# Patient Record
Sex: FEMALE | Race: WHITE | NOT HISPANIC OR LATINO | Employment: OTHER | URBAN - METROPOLITAN AREA
[De-identification: names, ages, dates, MRNs, and addresses within clinical notes are randomized per-mention and may not be internally consistent; named-entity substitution may affect disease eponyms.]

---

## 2018-05-15 ENCOUNTER — HOSPITAL ENCOUNTER (INPATIENT)
Facility: HOSPITAL | Age: 53
LOS: 7 days | Discharge: NURSING, REHAB OR LONG-TERM CARE | End: 2018-05-22
Attending: INTERNAL MEDICINE | Admitting: FAMILY MEDICINE

## 2018-05-15 ENCOUNTER — APPOINTMENT (OUTPATIENT)
Dept: GENERAL RADIOLOGY | Facility: HOSPITAL | Age: 53
End: 2018-05-15

## 2018-05-15 DIAGNOSIS — Z78.9 IMPAIRED MOBILITY AND ADLS: ICD-10-CM

## 2018-05-15 DIAGNOSIS — Z74.09 IMPAIRED FUNCTIONAL MOBILITY, BALANCE, GAIT, AND ENDURANCE: Primary | ICD-10-CM

## 2018-05-15 DIAGNOSIS — Z74.09 IMPAIRED MOBILITY AND ADLS: ICD-10-CM

## 2018-05-15 PROBLEM — J96.21 ACUTE ON CHRONIC RESPIRATORY FAILURE WITH HYPOXIA AND HYPERCAPNIA (HCC): Status: ACTIVE | Noted: 2018-05-15

## 2018-05-15 PROBLEM — J96.22 ACUTE ON CHRONIC RESPIRATORY FAILURE WITH HYPOXIA AND HYPERCAPNIA (HCC): Status: ACTIVE | Noted: 2018-05-15

## 2018-05-15 PROBLEM — F41.9 ANXIETY AND DEPRESSION: Status: ACTIVE | Noted: 2018-05-15

## 2018-05-15 PROBLEM — E03.9 HYPOTHYROID: Status: ACTIVE | Noted: 2018-05-15

## 2018-05-15 PROBLEM — F32.A DEPRESSION: Status: ACTIVE | Noted: 2018-05-15

## 2018-05-15 PROBLEM — R06.03 ACUTE RESPIRATORY DISTRESS: Status: ACTIVE | Noted: 2018-05-15

## 2018-05-15 PROBLEM — J18.9 PNEUMONIA DUE TO INFECTIOUS ORGANISM: Status: ACTIVE | Noted: 2018-05-15

## 2018-05-15 PROBLEM — J44.1 CHRONIC OBSTRUCTIVE PULMONARY DISEASE WITH ACUTE EXACERBATION (HCC): Status: ACTIVE | Noted: 2018-05-15

## 2018-05-15 PROBLEM — J44.9 COPD (CHRONIC OBSTRUCTIVE PULMONARY DISEASE) (HCC): Status: ACTIVE | Noted: 2018-05-15

## 2018-05-15 LAB
ALBUMIN SERPL-MCNC: 3.5 G/DL (ref 3.2–4.8)
ALBUMIN/GLOB SERPL: 1.5 G/DL (ref 1.5–2.5)
ALP SERPL-CCNC: 81 U/L (ref 25–100)
ALT SERPL W P-5'-P-CCNC: 31 U/L (ref 7–40)
AMPHET+METHAMPHET UR QL: NEGATIVE
AMPHETAMINES UR QL: NEGATIVE
AMYLASE SERPL-CCNC: 82 U/L (ref 30–118)
ANION GAP SERPL CALCULATED.3IONS-SCNC: 7 MMOL/L (ref 3–11)
ARTERIAL PATENCY WRIST A: ABNORMAL
AST SERPL-CCNC: 27 U/L (ref 0–33)
ATMOSPHERIC PRESS: ABNORMAL MMHG
BACTERIA UR QL AUTO: ABNORMAL /HPF
BARBITURATES UR QL SCN: NEGATIVE
BASE EXCESS BLDA CALC-SCNC: 3.6 MMOL/L (ref 0–2)
BASOPHILS # BLD AUTO: 0.01 10*3/MM3 (ref 0–0.2)
BASOPHILS NFR BLD AUTO: 0.1 % (ref 0–1)
BDY SITE: ABNORMAL
BENZODIAZ UR QL SCN: POSITIVE
BILIRUB SERPL-MCNC: 0.5 MG/DL (ref 0.3–1.2)
BILIRUB UR QL STRIP: NEGATIVE
BNP SERPL-MCNC: 163 PG/ML (ref 0–100)
BUN BLD-MCNC: 24 MG/DL (ref 9–23)
BUN/CREAT SERPL: 26.7 (ref 7–25)
BUPRENORPHINE SERPL-MCNC: NEGATIVE NG/ML
CALCIUM SPEC-SCNC: 8.6 MG/DL (ref 8.7–10.4)
CANNABINOIDS SERPL QL: NEGATIVE
CHLORIDE SERPL-SCNC: 103 MMOL/L (ref 99–109)
CLARITY UR: CLEAR
CO2 BLDA-SCNC: 30 MMOL/L (ref 22–33)
CO2 SERPL-SCNC: 29 MMOL/L (ref 20–31)
COCAINE UR QL: NEGATIVE
COHGB MFR BLD: 1.4 % (ref 0–2)
COLOR UR: YELLOW
CREAT BLD-MCNC: 0.9 MG/DL (ref 0.6–1.3)
D-LACTATE SERPL-SCNC: 1.6 MMOL/L (ref 0.5–2)
DEPRECATED RDW RBC AUTO: 51.7 FL (ref 37–54)
EOSINOPHIL # BLD AUTO: 0 10*3/MM3 (ref 0–0.3)
EOSINOPHIL NFR BLD AUTO: 0 % (ref 0–3)
ERYTHROCYTE [DISTWIDTH] IN BLOOD BY AUTOMATED COUNT: 15 % (ref 11.3–14.5)
GFR SERPL CREATININE-BSD FRML MDRD: 65 ML/MIN/1.73
GLOBULIN UR ELPH-MCNC: 2.3 GM/DL
GLUCOSE BLD-MCNC: 131 MG/DL (ref 70–100)
GLUCOSE BLDC GLUCOMTR-MCNC: 145 MG/DL (ref 70–130)
GLUCOSE UR STRIP-MCNC: NEGATIVE MG/DL
HCO3 BLDA-SCNC: 28.7 MMOL/L (ref 20–26)
HCT VFR BLD AUTO: 39.9 % (ref 34.5–44)
HCT VFR BLD CALC: 37.8 %
HGB BLD-MCNC: 12 G/DL (ref 11.5–15.5)
HGB BLDA-MCNC: 12.3 G/DL (ref 14–18)
HGB UR QL STRIP.AUTO: ABNORMAL
HOROWITZ INDEX BLD+IHG-RTO: 30 %
HYALINE CASTS UR QL AUTO: ABNORMAL /LPF
IMM GRANULOCYTES # BLD: 0.11 10*3/MM3 (ref 0–0.03)
IMM GRANULOCYTES NFR BLD: 0.7 % (ref 0–0.6)
INR PPP: 0.97 (ref 0.91–1.09)
KETONES UR QL STRIP: NEGATIVE
LEUKOCYTE ESTERASE UR QL STRIP.AUTO: ABNORMAL
LIPASE SERPL-CCNC: 30 U/L (ref 6–51)
LYMPHOCYTES # BLD AUTO: 0.39 10*3/MM3 (ref 0.6–4.8)
LYMPHOCYTES NFR BLD AUTO: 2.4 % (ref 24–44)
MAGNESIUM SERPL-MCNC: 1.8 MG/DL (ref 1.3–2.7)
MCH RBC QN AUTO: 28.3 PG (ref 27–31)
MCHC RBC AUTO-ENTMCNC: 30.1 G/DL (ref 32–36)
MCV RBC AUTO: 94.1 FL (ref 80–99)
METHADONE UR QL SCN: NEGATIVE
METHGB BLD QL: 0.8 % (ref 0–1.5)
MODALITY: ABNORMAL
MONOCYTES # BLD AUTO: 0.32 10*3/MM3 (ref 0–1)
MONOCYTES NFR BLD AUTO: 2 % (ref 0–12)
NEUTROPHILS # BLD AUTO: 15.54 10*3/MM3 (ref 1.5–8.3)
NEUTROPHILS NFR BLD AUTO: 95.5 % (ref 41–71)
NITRITE UR QL STRIP: NEGATIVE
OPIATES UR QL: NEGATIVE
OXYCODONE UR QL SCN: NEGATIVE
OXYHGB MFR BLDV: 93.1 % (ref 94–99)
PCO2 BLDA: 44.2 MM HG (ref 35–45)
PCP UR QL SCN: NEGATIVE
PH BLDA: 7.42 PH UNITS (ref 7.35–7.45)
PH UR STRIP.AUTO: 6.5 [PH] (ref 5–8)
PHOSPHATE SERPL-MCNC: 3.2 MG/DL (ref 2.4–5.1)
PLATELET # BLD AUTO: 312 10*3/MM3 (ref 150–450)
PMV BLD AUTO: 9.8 FL (ref 6–12)
PO2 BLDA: 65.4 MM HG (ref 83–108)
POTASSIUM BLD-SCNC: 4.7 MMOL/L (ref 3.5–5.5)
PROCALCITONIN SERPL-MCNC: 0.34 NG/ML
PROPOXYPH UR QL: NEGATIVE
PROT SERPL-MCNC: 5.8 G/DL (ref 5.7–8.2)
PROT UR QL STRIP: ABNORMAL
PROTHROMBIN TIME: 10.2 SECONDS (ref 9.6–11.5)
RBC # BLD AUTO: 4.24 10*6/MM3 (ref 3.89–5.14)
RBC # UR: ABNORMAL /HPF
REF LAB TEST METHOD: ABNORMAL
SODIUM BLD-SCNC: 139 MMOL/L (ref 132–146)
SP GR UR STRIP: 1.02 (ref 1–1.03)
SQUAMOUS #/AREA URNS HPF: ABNORMAL /HPF
TRICYCLICS UR QL SCN: NEGATIVE
TROPONIN I SERPL-MCNC: 0.04 NG/ML
TSH SERPL DL<=0.05 MIU/L-ACNC: 1.15 MIU/ML (ref 0.35–5.35)
UROBILINOGEN UR QL STRIP: ABNORMAL
WBC NRBC COR # BLD: 16.26 10*3/MM3 (ref 3.5–10.8)
WBC UR QL AUTO: ABNORMAL /HPF

## 2018-05-15 PROCEDURE — 25010000002 LEVOFLOXACIN PER 250 MG: Performed by: INTERNAL MEDICINE

## 2018-05-15 PROCEDURE — 94799 UNLISTED PULMONARY SVC/PX: CPT

## 2018-05-15 PROCEDURE — 84100 ASSAY OF PHOSPHORUS: CPT | Performed by: NURSE PRACTITIONER

## 2018-05-15 PROCEDURE — 84145 PROCALCITONIN (PCT): CPT | Performed by: NURSE PRACTITIONER

## 2018-05-15 PROCEDURE — 82962 GLUCOSE BLOOD TEST: CPT

## 2018-05-15 PROCEDURE — 84443 ASSAY THYROID STIM HORMONE: CPT | Performed by: NURSE PRACTITIONER

## 2018-05-15 PROCEDURE — 85025 COMPLETE CBC W/AUTO DIFF WBC: CPT | Performed by: NURSE PRACTITIONER

## 2018-05-15 PROCEDURE — 25010000002 CEFTRIAXONE PER 250 MG: Performed by: INTERNAL MEDICINE

## 2018-05-15 PROCEDURE — 85610 PROTHROMBIN TIME: CPT | Performed by: NURSE PRACTITIONER

## 2018-05-15 PROCEDURE — 84484 ASSAY OF TROPONIN QUANT: CPT | Performed by: NURSE PRACTITIONER

## 2018-05-15 PROCEDURE — 99291 CRITICAL CARE FIRST HOUR: CPT | Performed by: INTERNAL MEDICINE

## 2018-05-15 PROCEDURE — P9041 ALBUMIN (HUMAN),5%, 50ML: HCPCS | Performed by: NURSE PRACTITIONER

## 2018-05-15 PROCEDURE — 80053 COMPREHEN METABOLIC PANEL: CPT | Performed by: NURSE PRACTITIONER

## 2018-05-15 PROCEDURE — 82805 BLOOD GASES W/O2 SATURATION: CPT | Performed by: NURSE PRACTITIONER

## 2018-05-15 PROCEDURE — 82150 ASSAY OF AMYLASE: CPT | Performed by: NURSE PRACTITIONER

## 2018-05-15 PROCEDURE — 87899 AGENT NOS ASSAY W/OPTIC: CPT | Performed by: INTERNAL MEDICINE

## 2018-05-15 PROCEDURE — 94640 AIRWAY INHALATION TREATMENT: CPT

## 2018-05-15 PROCEDURE — 94760 N-INVAS EAR/PLS OXIMETRY 1: CPT

## 2018-05-15 PROCEDURE — 80306 DRUG TEST PRSMV INSTRMNT: CPT | Performed by: NURSE PRACTITIONER

## 2018-05-15 PROCEDURE — 87040 BLOOD CULTURE FOR BACTERIA: CPT | Performed by: INTERNAL MEDICINE

## 2018-05-15 PROCEDURE — 25010000002 HEPARIN (PORCINE) PER 1000 UNITS: Performed by: NURSE PRACTITIONER

## 2018-05-15 PROCEDURE — 25010000002 ALBUMIN HUMAN 5% PER 50 ML: Performed by: NURSE PRACTITIONER

## 2018-05-15 PROCEDURE — 83605 ASSAY OF LACTIC ACID: CPT | Performed by: NURSE PRACTITIONER

## 2018-05-15 PROCEDURE — 81001 URINALYSIS AUTO W/SCOPE: CPT | Performed by: NURSE PRACTITIONER

## 2018-05-15 PROCEDURE — 36600 WITHDRAWAL OF ARTERIAL BLOOD: CPT | Performed by: NURSE PRACTITIONER

## 2018-05-15 PROCEDURE — 25010000002 LORAZEPAM PER 2 MG: Performed by: INTERNAL MEDICINE

## 2018-05-15 PROCEDURE — 25010000002 METHYLPREDNISOLONE PER 40 MG: Performed by: INTERNAL MEDICINE

## 2018-05-15 PROCEDURE — 5A1935Z RESPIRATORY VENTILATION, LESS THAN 24 CONSECUTIVE HOURS: ICD-10-PCS | Performed by: INTERNAL MEDICINE

## 2018-05-15 PROCEDURE — 94002 VENT MGMT INPAT INIT DAY: CPT

## 2018-05-15 PROCEDURE — 83880 ASSAY OF NATRIURETIC PEPTIDE: CPT | Performed by: NURSE PRACTITIONER

## 2018-05-15 PROCEDURE — 71045 X-RAY EXAM CHEST 1 VIEW: CPT

## 2018-05-15 PROCEDURE — 83690 ASSAY OF LIPASE: CPT | Performed by: NURSE PRACTITIONER

## 2018-05-15 PROCEDURE — 83735 ASSAY OF MAGNESIUM: CPT | Performed by: NURSE PRACTITIONER

## 2018-05-15 PROCEDURE — 87449 NOS EACH ORGANISM AG IA: CPT | Performed by: INTERNAL MEDICINE

## 2018-05-15 RX ORDER — FAMOTIDINE 10 MG/ML
20 INJECTION, SOLUTION INTRAVENOUS EVERY 12 HOURS SCHEDULED
Status: DISCONTINUED | OUTPATIENT
Start: 2018-05-15 | End: 2018-05-15 | Stop reason: SDUPTHER

## 2018-05-15 RX ORDER — ACETAMINOPHEN 325 MG/1
650 TABLET ORAL EVERY 4 HOURS PRN
Status: DISCONTINUED | OUTPATIENT
Start: 2018-05-15 | End: 2018-05-17

## 2018-05-15 RX ORDER — LEVOFLOXACIN 5 MG/ML
750 INJECTION, SOLUTION INTRAVENOUS EVERY 24 HOURS
Status: DISCONTINUED | OUTPATIENT
Start: 2018-05-15 | End: 2018-05-17

## 2018-05-15 RX ORDER — PANTOPRAZOLE SODIUM 40 MG/10ML
40 INJECTION, POWDER, LYOPHILIZED, FOR SOLUTION INTRAVENOUS
Status: DISCONTINUED | OUTPATIENT
Start: 2018-05-16 | End: 2018-05-17

## 2018-05-15 RX ORDER — CEFTRIAXONE SODIUM 1 G/50ML
1 INJECTION, SOLUTION INTRAVENOUS EVERY 24 HOURS
Status: DISCONTINUED | OUTPATIENT
Start: 2018-05-15 | End: 2018-05-17

## 2018-05-15 RX ORDER — ONDANSETRON 2 MG/ML
4 INJECTION INTRAMUSCULAR; INTRAVENOUS EVERY 6 HOURS PRN
Status: DISCONTINUED | OUTPATIENT
Start: 2018-05-15 | End: 2018-05-22 | Stop reason: HOSPADM

## 2018-05-15 RX ORDER — IPRATROPIUM BROMIDE AND ALBUTEROL SULFATE 2.5; .5 MG/3ML; MG/3ML
3 SOLUTION RESPIRATORY (INHALATION)
Status: DISCONTINUED | OUTPATIENT
Start: 2018-05-15 | End: 2018-05-22 | Stop reason: HOSPADM

## 2018-05-15 RX ORDER — METHYLPREDNISOLONE SODIUM SUCCINATE 40 MG/ML
40 INJECTION, POWDER, LYOPHILIZED, FOR SOLUTION INTRAMUSCULAR; INTRAVENOUS EVERY 12 HOURS
Status: DISCONTINUED | OUTPATIENT
Start: 2018-05-15 | End: 2018-05-17 | Stop reason: SDUPTHER

## 2018-05-15 RX ORDER — SODIUM CHLORIDE 0.9 % (FLUSH) 0.9 %
1-10 SYRINGE (ML) INJECTION AS NEEDED
Status: DISCONTINUED | OUTPATIENT
Start: 2018-05-15 | End: 2018-05-22 | Stop reason: HOSPADM

## 2018-05-15 RX ORDER — LORAZEPAM 2 MG/ML
1 INJECTION INTRAMUSCULAR ONCE
Status: COMPLETED | OUTPATIENT
Start: 2018-05-15 | End: 2018-05-15

## 2018-05-15 RX ORDER — CHLORHEXIDINE GLUCONATE 0.12 MG/ML
15 RINSE ORAL EVERY 12 HOURS SCHEDULED
Status: DISCONTINUED | OUTPATIENT
Start: 2018-05-15 | End: 2018-05-17

## 2018-05-15 RX ORDER — ACETAMINOPHEN 650 MG/1
650 SUPPOSITORY RECTAL EVERY 4 HOURS PRN
Status: DISCONTINUED | OUTPATIENT
Start: 2018-05-15 | End: 2018-05-22 | Stop reason: HOSPADM

## 2018-05-15 RX ORDER — CHLORHEXIDINE GLUCONATE 0.12 MG/ML
15 RINSE ORAL EVERY 12 HOURS SCHEDULED
Status: DISCONTINUED | OUTPATIENT
Start: 2018-05-15 | End: 2018-05-15

## 2018-05-15 RX ORDER — ALBUMIN, HUMAN INJ 5% 5 %
25 SOLUTION INTRAVENOUS ONCE
Status: COMPLETED | OUTPATIENT
Start: 2018-05-15 | End: 2018-05-15

## 2018-05-15 RX ORDER — SODIUM CHLORIDE 9 MG/ML
100 INJECTION, SOLUTION INTRAVENOUS CONTINUOUS
Status: DISCONTINUED | OUTPATIENT
Start: 2018-05-15 | End: 2018-05-16

## 2018-05-15 RX ORDER — MAGNESIUM SULFATE HEPTAHYDRATE 40 MG/ML
4 INJECTION, SOLUTION INTRAVENOUS AS NEEDED
Status: DISCONTINUED | OUTPATIENT
Start: 2018-05-15 | End: 2018-05-22 | Stop reason: HOSPADM

## 2018-05-15 RX ORDER — MAGNESIUM SULFATE HEPTAHYDRATE 40 MG/ML
2 INJECTION, SOLUTION INTRAVENOUS AS NEEDED
Status: DISCONTINUED | OUTPATIENT
Start: 2018-05-15 | End: 2018-05-22 | Stop reason: HOSPADM

## 2018-05-15 RX ORDER — HEPARIN SODIUM 5000 [USP'U]/ML
5000 INJECTION, SOLUTION INTRAVENOUS; SUBCUTANEOUS EVERY 12 HOURS SCHEDULED
Status: DISCONTINUED | OUTPATIENT
Start: 2018-05-15 | End: 2018-05-16

## 2018-05-15 RX ORDER — PANTOPRAZOLE SODIUM 40 MG/10ML
40 INJECTION, POWDER, LYOPHILIZED, FOR SOLUTION INTRAVENOUS
Status: DISCONTINUED | OUTPATIENT
Start: 2018-05-16 | End: 2018-05-15

## 2018-05-15 RX ORDER — DEXMEDETOMIDINE HYDROCHLORIDE 4 UG/ML
.2-1.5 INJECTION, SOLUTION INTRAVENOUS
Status: DISCONTINUED | OUTPATIENT
Start: 2018-05-15 | End: 2018-05-16

## 2018-05-15 RX ORDER — IPRATROPIUM BROMIDE AND ALBUTEROL SULFATE 2.5; .5 MG/3ML; MG/3ML
3 SOLUTION RESPIRATORY (INHALATION) EVERY 4 HOURS PRN
Status: DISCONTINUED | OUTPATIENT
Start: 2018-05-15 | End: 2018-05-22 | Stop reason: HOSPADM

## 2018-05-15 RX ADMIN — DEXMEDETOMIDINE HYDROCHLORIDE 0.6 MCG/KG/HR: 4 INJECTION, SOLUTION INTRAVENOUS at 19:15

## 2018-05-15 RX ADMIN — CEFTRIAXONE SODIUM 1 G: 1 INJECTION, SOLUTION INTRAVENOUS at 21:29

## 2018-05-15 RX ADMIN — IPRATROPIUM BROMIDE AND ALBUTEROL SULFATE 3 ML: 2.5; .5 SOLUTION RESPIRATORY (INHALATION) at 20:03

## 2018-05-15 RX ADMIN — HEPARIN SODIUM 5000 UNITS: 5000 INJECTION, SOLUTION INTRAVENOUS; SUBCUTANEOUS at 20:00

## 2018-05-15 RX ADMIN — METHYLPREDNISOLONE SODIUM SUCCINATE 40 MG: 40 INJECTION, POWDER, FOR SOLUTION INTRAMUSCULAR; INTRAVENOUS at 21:21

## 2018-05-15 RX ADMIN — LEVOFLOXACIN 750 MG: 5 INJECTION, SOLUTION INTRAVENOUS at 22:07

## 2018-05-15 RX ADMIN — SODIUM CHLORIDE 100 ML/HR: 9 INJECTION, SOLUTION INTRAVENOUS at 23:03

## 2018-05-15 RX ADMIN — DEXMEDETOMIDINE HYDROCHLORIDE 0.8 MCG/KG/HR: 4 INJECTION, SOLUTION INTRAVENOUS at 19:33

## 2018-05-15 RX ADMIN — CHLORHEXIDINE GLUCONATE 15 ML: 1.2 RINSE ORAL at 20:00

## 2018-05-15 RX ADMIN — LORAZEPAM 1 MG: 2 INJECTION INTRAMUSCULAR; INTRAVENOUS at 18:58

## 2018-05-15 RX ADMIN — MAGNESIUM SULFATE HEPTAHYDRATE 4 G: 40 INJECTION, SOLUTION INTRAVENOUS at 21:27

## 2018-05-15 RX ADMIN — ALBUMIN HUMAN 25 G: 0.05 INJECTION, SOLUTION INTRAVENOUS at 21:45

## 2018-05-15 NOTE — H&P
Chief complaint:  Shortness of breath    Subjective     Patient is a 52 y.o. female who presented to [facility] emergency room this morning about 8 AM.  History is obtained from the chart as the patient is currently intubated.  The patient has a history of COPD.  She apparently was okay the night before but early in the morning developed acute shortness of breath.  She was tripoding in her home.  On arrival she was documented as confused and able to follow commands.  She was intubated emergently department.  Bibasal or left greater than right infiltrates were noted and felt to be consistent with acute pneumonia.  She was given Zosyn and Levaquin in the emergency department there.  They had no ICU beds and no ICU beds were available in than several hospitals for the patient remained there for the majority of the day and was transported here and arrived around 1830.  On arrival she was intubated but was not ventilated and simply had an oxygen source of 6 L attached to her endotracheal tube.  She was awake, alert, and anxious.      History  Past Medical History:   Diagnosis Date   • Anxiety    • Depression 5/15/2018   • H/O  section    • Hypothyroid 5/15/2018     History reviewed. No pertinent surgical history.  History reviewed. No pertinent family history.  Social History   Substance Use Topics   • Smoking status: Not on file   • Smokeless tobacco: Not on file   • Alcohol use Not on file     No prescriptions prior to admission.     Allergies:  Review of patient's allergies indicates not on file.    Review of Systems   Review of systems could not be obtained due to   patient intubated.      Objective     Vital Signs  Temp:  [97.8 °F (36.6 °C)] 97.8 °F (36.6 °C)  Heart Rate:  [118] 118  Resp:  [23] 23  BP: (126)/(91) 126/91    Physical Exam:    Objective:  General Appearance:  Uncomfortable.    Vital signs: (most recent): Blood pressure 126/91, pulse 118, temperature 97.8 °F (36.6 °C),  temperature source Axillary, resp. rate 23, weight 69 kg (152 lb 1.9 oz), SpO2 100 %.    HEENT: Normal HEENT exam.  (Oral ET tube)    Lungs:  Increased effort.  She is in respiratory distress.  There are decreased breath sounds and wheezes.  No rales or rhonchi.    Heart: Tachycardia.  Regular rhythm.  S1 normal and S2 normal.  No murmur, gallop or friction rub.   Chest: Symmetric chest wall expansion.   Abdomen: Abdomen is distended.  (Mild gaseous distention).  Bowel sounds are normal.   There is no abdominal tenderness.   There is no mass. There is no splenomegaly. There is no hepatomegaly.   Extremities: There is no deformity or dependent edema.    Neurological: Patient is alert.    Pupils:  Pupils are equal, round, and reactive to light.    Skin:  Warm and dry.              Results Review:    I reviewed the patient's new clinical results.  I reviewed the patient's other test results and agree with the interpretation  I personally viewed and interpreted the patient's EKG/Telemetry data    Assessment/Plan     Assessment:    Hospital Problem List     Acute on chronic respiratory failure with hypoxia and hypercapnia    Chronic obstructive pulmonary disease with acute exacerbation    Pneumonia due to infectious organism    Anxiety and depression    Hypothyroid          52-year-old female with a history of COPD of unknown physiologic severity but with apparent chronic CO2 retention based upon her blood gases.  There is evidence of acute infiltrates consistent with pneumonia by chest x-ray.  She was intubated at the outlying facility for acute on chronic respiratory failure.  She was given Zosyn and Levaquin in [facility] prior to transport here as well as steroids and bronchodilators.    Plan:    1. Patient placed on mechanical ventilation though was transported without ventilation  2. IV steroids  3. Rocephin and Levaquin  4. Panculture  5. Bronchodilators  6. Daily weaning assessment    I discussed the  patients findings and my recommendations with patient and nursing staff.     Critical Care time spent in direct patient care: 50 minutes (excluding procedure time, if applicable) including high complexity decision making to assess, manipulate, and support vital organ system failure in this individual who has impairment of one or more vital organ systems such that there is a high probability of imminent or life threatening deterioration in the patient’s condition.      Nickolas Dunham MD  Pulmonary and Critical Care Medicine  05/15/18  7:20 PM

## 2018-05-15 NOTE — NURSING NOTE
ACC REVIEW REPORT: [facility]        PATIENT NAME: Lalita Valentino    PATIENT ID: [id number]    BED: N239    BED TYPE: ICU    BED GIVEN TO: PRIYA BARBOZA RN    TIME BED GIVEN: 1505    YOB: 1965    AGE: 52 YRS    GENDER: FEMALE    PREVIOUS ADMIT TO [facility]:     PREVIOUS ADMISSION DATE:     PATIENT CLASS:     TODAY'S DATE: 5/15/2018    TRANSFER DATE: 05/15/18    ETA:     TRANSFERRING FACILITY: [facility] ER    TRANSFERRING FACILITY PHONE # : [phone]    TRANSFERRING MD: DR GONZALEZ    DATE/TIME REQUEST RECEIVED: 05/15/18 @ 6758    [facility] RN: KISHA NELSON    REPORT FROM: PRIYA BARBOZA RN     TIME REPORT TAKEN: 1505    DIAGNOSIS: RESP FAILURE, PNEUMONIA    REASON FOR TRANSFER TO [facility]: HIGHER LEVEL CARE    TRANSPORTATION: GROUND    CLINICAL REASON FOR TRANSFER TO [facility]: EMS BROUGHT PATIENT IN TO THE ER IN RESP DISTRESS. SHE COULDN'T TALK AND WAS INTUBATED SHORTLY AFTERWARDS.  THEY DON'T HAVE MUCH HISTORY ON HER.  SHE WAS A PATIENT HERE AT [facility] OVER 2 YRS AGO.        CLINICAL INFORMATION    HEIGHT:     WEIGHT:     ALLERGIES: NKDA    TREJO: 16 FR TO RIGHT LEG, HAS A TOTAL  ML UOP       URINE HAD 1+ PROTEIN AND 1+ MUCOUS.    INFECTIOUS DISEASE:     ISOLATION:     1ST VITAL SIGNS:   TIME:   TEMP:   PULSE:   B/P:   RESP:     LAST VITAL SIGNS:  TIME: 1512  TEMP: AFEBRILE  PULSE: 62  B/P: 105/77  RESP: 14  95%    LAB INFORMATION: WBC-12.47, POTASSIUM-5.2, C02-32.6 (VENOUS) CREAT-1.3, GFR-45, GLUCOSE ON ARRIVAL , LAST GLUCOSE .  DTF=658    CULTURE INFORMATION:     MEDS/IV FLUIDS: # 29 G LFA,     # 20 G RFA  ZOSYN 3.375,  1.5 L OF NS    AND CURRENTLY HAS D5NS GOING @ 125/NL/HR.  SHE'S HAD A TOTAL OF ABOUT 2L OF FLUIDS.    BEFORE SHE WAS INTUBATED, SHE HAD SOLUMEDROL 125 Mg, ALBUTEROL TX X 2, ETOMIDATE 10, 100 SUCCS.  VERSED 4 MG BOLUS, STARTED HER @ 2, TITRATED HER UP TO 6, BUT IT WAS DROPPING HER B/P-SO THEY TOOK HER OFF VERSED.  SHE IS AWAKE, ALERT AND CALM, HAS NO SEDATION.  SHE WAS ALSO  GIVEN CACATIMINE 50 MG Iv.        CARDIAC SYSTEM:    CHEST PAIN:     RATE:     SCALE:     RHYTHM: ST ON ARRIVAL     Is patient taking or has patient been given any drugs that could increase bleeding? No, PER REPORT  (Plavix, Brilinta, Effient, Eliquis, Xarelto, Warfarin, Integrilin, Angiomax)    DRUG:      DOSE/FREQUENCY:     CARDIAC ENZYMES:  WERE NEGATIVE    DATE:   TIME:   CK:   CKMB:   MARK:   TROP:     DATE:   TIME:   CK:   CKMB:   MARK:   TROP:           CARDIAC NOTES:  ST ON ARRIVAL      RESPIRATORY SYSTEM:    LUNG SOUNDS:    CLEAR:   CRACKLES:   WHEEZES:   RHONCHI:   DIMINISHED: YES    ABG DATE: 05/15/18        ABG TIME:     ABG RESULTS:    PH: 7.226  PO2: 136  PCO2:81.9   HCO3: 34  O2 SAT:       ETT:     ETT SIZE: 7.5    ORAL: YES    NASAL:     SECURED AT MEASUREMENT (CM): 22 CM @ THE LIP    ON VENTILATOR:YES    TV: 480  FI02: 35%  RATE: 22  PEEP: 15  02SAT=95%    OXYGEN:     O2 SAT:     ADMINISTRATION ROUTE:     IMAGING FINDINGS:     PNEUMO LOCATION:     PNEUMO SIZE:     PNEUMO CHEST TUBE SEAL TYPE:     RADIOLOGY RESULTS: BILATERAL PNEUMONIA    RESPIRATORY STATUS:       CNS/MUSCULOSKELETAL    ALERT AND ORIENTED:  SHE IS NOT SEDATED AND ALERT.  SHE WILL SQUEEZE YOU HAND    PERSON:   PLACE:   TIME:     I      CAT SCAN RESULTS: HEAD WAS NEGATIVE FOR AN ACUTE PROCESS    MRI RESULTS:     CNS/MUSCULOSKELETAL NOTES:       GI//GY      ABDOMINAL PAIN:     VOMITING:     DIARRHEA:     NAUSEA:     BOWEL SOUNDS:     OCCULT STOOL:     VAGINAL BLEEDING:     TESTICULAR PAIN:     HEMATURIA:     NG TUBE:    SIZE:   DATE INSERTED:       ULTRASOUND:     ULTRASOUND RESULTS:       ACUTE ABDOMEN:     ACUTE ABDOMEN RESULTS:       CT SCAN:     CT SCAN RESULTS:       GI//GY NOTES:     PAST MEDICAL HISTORY: UNAVAILABLE, PATIENT WAS INTUBATED SHORTLY AFTER ARRIVAL. COPD, THYROID WAS REMOVED OR POSSIBLY ALL REMOVED, ANXIETY, SHE'S HAD A BLISTER REMOVED FROM HER RIGHT LUNG,  X 3.      OTHER SYMPTOM NOTES:     ADDITIONAL  NOTES: NASOGASTRIC 14 FR LEFT NARE, 60 CM          Parul Munoz, RN  5/15/2018  2:54 PM

## 2018-05-16 ENCOUNTER — APPOINTMENT (OUTPATIENT)
Dept: CARDIOLOGY | Facility: HOSPITAL | Age: 53
End: 2018-05-16
Attending: INTERNAL MEDICINE

## 2018-05-16 ENCOUNTER — APPOINTMENT (OUTPATIENT)
Dept: CT IMAGING | Facility: HOSPITAL | Age: 53
End: 2018-05-16
Attending: INTERNAL MEDICINE

## 2018-05-16 ENCOUNTER — APPOINTMENT (OUTPATIENT)
Dept: GENERAL RADIOLOGY | Facility: HOSPITAL | Age: 53
End: 2018-05-16

## 2018-05-16 ENCOUNTER — APPOINTMENT (OUTPATIENT)
Dept: CT IMAGING | Facility: HOSPITAL | Age: 53
End: 2018-05-16

## 2018-05-16 LAB
ALBUMIN SERPL-MCNC: 3.9 G/DL (ref 3.2–4.8)
ALBUMIN/GLOB SERPL: 1.8 G/DL (ref 1.5–2.5)
ALP SERPL-CCNC: 60 U/L (ref 25–100)
ALT SERPL W P-5'-P-CCNC: 21 U/L (ref 7–40)
ANION GAP SERPL CALCULATED.3IONS-SCNC: 5 MMOL/L (ref 3–11)
ARTERIAL PATENCY WRIST A: ABNORMAL
ARTERIAL PATENCY WRIST A: POSITIVE
AST SERPL-CCNC: 18 U/L (ref 0–33)
ATMOSPHERIC PRESS: ABNORMAL MMHG
ATMOSPHERIC PRESS: ABNORMAL MMHG
BASE EXCESS BLDA CALC-SCNC: 4.3 MMOL/L (ref 0–2)
BASE EXCESS BLDA CALC-SCNC: 4.3 MMOL/L (ref 0–2)
BASOPHILS # BLD AUTO: 0.01 10*3/MM3 (ref 0–0.2)
BASOPHILS NFR BLD AUTO: 0.1 % (ref 0–1)
BDY SITE: ABNORMAL
BDY SITE: ABNORMAL
BILIRUB SERPL-MCNC: 0.5 MG/DL (ref 0.3–1.2)
BUN BLD-MCNC: 23 MG/DL (ref 9–23)
BUN/CREAT SERPL: 28.8 (ref 7–25)
CALCIUM SPEC-SCNC: 8.6 MG/DL (ref 8.7–10.4)
CHLORIDE SERPL-SCNC: 103 MMOL/L (ref 99–109)
CO2 BLDA-SCNC: 30.6 MMOL/L (ref 22–33)
CO2 BLDA-SCNC: 31 MMOL/L (ref 22–33)
CO2 SERPL-SCNC: 29 MMOL/L (ref 20–31)
COHGB MFR BLD: 1.4 % (ref 0–2)
COHGB MFR BLD: 1.6 % (ref 0–2)
CREAT BLD-MCNC: 0.8 MG/DL (ref 0.6–1.3)
D-LACTATE SERPL-SCNC: 1.8 MMOL/L (ref 0.5–2)
DEPRECATED RDW RBC AUTO: 52.4 FL (ref 37–54)
EOSINOPHIL # BLD AUTO: 0 10*3/MM3 (ref 0–0.3)
EOSINOPHIL NFR BLD AUTO: 0 % (ref 0–3)
ERYTHROCYTE [DISTWIDTH] IN BLOOD BY AUTOMATED COUNT: 15.2 % (ref 11.3–14.5)
GFR SERPL CREATININE-BSD FRML MDRD: 75 ML/MIN/1.73
GLOBULIN UR ELPH-MCNC: 2.2 GM/DL
GLUCOSE BLD-MCNC: 122 MG/DL (ref 70–100)
GLUCOSE BLDC GLUCOMTR-MCNC: 102 MG/DL (ref 70–130)
GLUCOSE BLDC GLUCOMTR-MCNC: 110 MG/DL (ref 70–130)
GLUCOSE BLDC GLUCOMTR-MCNC: 125 MG/DL (ref 70–130)
GLUCOSE BLDC GLUCOMTR-MCNC: 137 MG/DL (ref 70–130)
HCO3 BLDA-SCNC: 29.2 MMOL/L (ref 20–26)
HCO3 BLDA-SCNC: 29.6 MMOL/L (ref 20–26)
HCT VFR BLD AUTO: 33.1 % (ref 34.5–44)
HCT VFR BLD CALC: 30.1 %
HCT VFR BLD CALC: 31.5 %
HGB BLD-MCNC: 10.3 G/DL (ref 11.5–15.5)
HGB BLDA-MCNC: 10.3 G/DL (ref 14–18)
HGB BLDA-MCNC: 9.8 G/DL (ref 14–18)
HOROWITZ INDEX BLD+IHG-RTO: 30 %
HOROWITZ INDEX BLD+IHG-RTO: 35 %
IMM GRANULOCYTES # BLD: 0.09 10*3/MM3 (ref 0–0.03)
IMM GRANULOCYTES NFR BLD: 0.7 % (ref 0–0.6)
LYMPHOCYTES # BLD AUTO: 0.49 10*3/MM3 (ref 0.6–4.8)
LYMPHOCYTES NFR BLD AUTO: 4 % (ref 24–44)
MAGNESIUM SERPL-MCNC: 3 MG/DL (ref 1.3–2.7)
MCH RBC QN AUTO: 29.3 PG (ref 27–31)
MCHC RBC AUTO-ENTMCNC: 31.1 G/DL (ref 32–36)
MCV RBC AUTO: 94 FL (ref 80–99)
METHGB BLD QL: 0.8 % (ref 0–1.5)
METHGB BLD QL: 1.1 % (ref 0–1.5)
MODALITY: ABNORMAL
MODALITY: ABNORMAL
MONOCYTES # BLD AUTO: 0.36 10*3/MM3 (ref 0–1)
MONOCYTES NFR BLD AUTO: 3 % (ref 0–12)
NEUTROPHILS # BLD AUTO: 11.27 10*3/MM3 (ref 1.5–8.3)
NEUTROPHILS NFR BLD AUTO: 92.9 % (ref 41–71)
OXYHGB MFR BLDV: 94.6 % (ref 94–99)
OXYHGB MFR BLDV: 95.9 % (ref 94–99)
PCO2 BLDA: 44.1 MM HG (ref 35–45)
PCO2 BLDA: 46.5 MM HG (ref 35–45)
PH BLDA: 7.41 PH UNITS (ref 7.35–7.45)
PH BLDA: 7.43 PH UNITS (ref 7.35–7.45)
PHOSPHATE SERPL-MCNC: 3.4 MG/DL (ref 2.4–5.1)
PLAT MORPH BLD: NORMAL
PLATELET # BLD AUTO: 177 10*3/MM3 (ref 150–450)
PMV BLD AUTO: 11.1 FL (ref 6–12)
PO2 BLDA: 76.1 MM HG (ref 83–108)
PO2 BLDA: 85.4 MM HG (ref 83–108)
POTASSIUM BLD-SCNC: 4.3 MMOL/L (ref 3.5–5.5)
PROT SERPL-MCNC: 6.1 G/DL (ref 5.7–8.2)
RBC # BLD AUTO: 3.52 10*6/MM3 (ref 3.89–5.14)
RBC MORPH BLD: NORMAL
SAO2 % BLDCOA: 94.6 %
SODIUM BLD-SCNC: 137 MMOL/L (ref 132–146)
TROPONIN I SERPL-MCNC: 0.02 NG/ML
WBC MORPH BLD: NORMAL
WBC NRBC COR # BLD: 12.13 10*3/MM3 (ref 3.5–10.8)

## 2018-05-16 PROCEDURE — 93306 TTE W/DOPPLER COMPLETE: CPT | Performed by: INTERNAL MEDICINE

## 2018-05-16 PROCEDURE — 36600 WITHDRAWAL OF ARTERIAL BLOOD: CPT | Performed by: NURSE PRACTITIONER

## 2018-05-16 PROCEDURE — 25010000002 LEVOFLOXACIN PER 250 MG: Performed by: INTERNAL MEDICINE

## 2018-05-16 PROCEDURE — 25010000002 ENOXAPARIN PER 10 MG: Performed by: INTERNAL MEDICINE

## 2018-05-16 PROCEDURE — 82805 BLOOD GASES W/O2 SATURATION: CPT | Performed by: NURSE PRACTITIONER

## 2018-05-16 PROCEDURE — 36600 WITHDRAWAL OF ARTERIAL BLOOD: CPT | Performed by: INTERNAL MEDICINE

## 2018-05-16 PROCEDURE — 25010000002 CEFTRIAXONE PER 250 MG: Performed by: INTERNAL MEDICINE

## 2018-05-16 PROCEDURE — 87040 BLOOD CULTURE FOR BACTERIA: CPT | Performed by: INTERNAL MEDICINE

## 2018-05-16 PROCEDURE — 71045 X-RAY EXAM CHEST 1 VIEW: CPT

## 2018-05-16 PROCEDURE — 93005 ELECTROCARDIOGRAM TRACING: CPT | Performed by: INTERNAL MEDICINE

## 2018-05-16 PROCEDURE — 94799 UNLISTED PULMONARY SVC/PX: CPT

## 2018-05-16 PROCEDURE — 83605 ASSAY OF LACTIC ACID: CPT | Performed by: NURSE PRACTITIONER

## 2018-05-16 PROCEDURE — 87070 CULTURE OTHR SPECIMN AEROBIC: CPT | Performed by: INTERNAL MEDICINE

## 2018-05-16 PROCEDURE — 85025 COMPLETE CBC W/AUTO DIFF WBC: CPT | Performed by: NURSE PRACTITIONER

## 2018-05-16 PROCEDURE — 94640 AIRWAY INHALATION TREATMENT: CPT

## 2018-05-16 PROCEDURE — P9041 ALBUMIN (HUMAN),5%, 50ML: HCPCS

## 2018-05-16 PROCEDURE — 84484 ASSAY OF TROPONIN QUANT: CPT | Performed by: INTERNAL MEDICINE

## 2018-05-16 PROCEDURE — 93010 ELECTROCARDIOGRAM REPORT: CPT | Performed by: INTERNAL MEDICINE

## 2018-05-16 PROCEDURE — 25010000002 HEPARIN (PORCINE) PER 1000 UNITS: Performed by: NURSE PRACTITIONER

## 2018-05-16 PROCEDURE — 25010000002 METHYLPREDNISOLONE PER 40 MG: Performed by: INTERNAL MEDICINE

## 2018-05-16 PROCEDURE — 74178 CT ABD&PLV WO CNTR FLWD CNTR: CPT

## 2018-05-16 PROCEDURE — 87205 SMEAR GRAM STAIN: CPT | Performed by: INTERNAL MEDICINE

## 2018-05-16 PROCEDURE — 25010000002 IOPAMIDOL 61 % SOLUTION: Performed by: INTERNAL MEDICINE

## 2018-05-16 PROCEDURE — 85007 BL SMEAR W/DIFF WBC COUNT: CPT | Performed by: NURSE PRACTITIONER

## 2018-05-16 PROCEDURE — 99291 CRITICAL CARE FIRST HOUR: CPT | Performed by: INTERNAL MEDICINE

## 2018-05-16 PROCEDURE — 25010000002 FENTANYL 10 MCG/1 ML NS: Performed by: INTERNAL MEDICINE

## 2018-05-16 PROCEDURE — 82962 GLUCOSE BLOOD TEST: CPT

## 2018-05-16 PROCEDURE — 84100 ASSAY OF PHOSPHORUS: CPT | Performed by: NURSE PRACTITIONER

## 2018-05-16 PROCEDURE — 25010000002 ALBUMIN HUMAN 5% PER 50 ML

## 2018-05-16 PROCEDURE — 80053 COMPREHEN METABOLIC PANEL: CPT | Performed by: NURSE PRACTITIONER

## 2018-05-16 PROCEDURE — 93306 TTE W/DOPPLER COMPLETE: CPT

## 2018-05-16 PROCEDURE — 94003 VENT MGMT INPAT SUBQ DAY: CPT

## 2018-05-16 PROCEDURE — 0 DIATRIZOATE MEGLUMINE & SODIUM PER 1 ML

## 2018-05-16 PROCEDURE — 83735 ASSAY OF MAGNESIUM: CPT | Performed by: NURSE PRACTITIONER

## 2018-05-16 PROCEDURE — 94760 N-INVAS EAR/PLS OXIMETRY 1: CPT

## 2018-05-16 PROCEDURE — 82805 BLOOD GASES W/O2 SATURATION: CPT | Performed by: INTERNAL MEDICINE

## 2018-05-16 RX ORDER — HYDROCODONE BITARTRATE AND ACETAMINOPHEN 10; 325 MG/1; MG/1
1 TABLET ORAL EVERY 6 HOURS PRN
Status: ON HOLD | COMMUNITY
End: 2018-05-16

## 2018-05-16 RX ORDER — MIRTAZAPINE 15 MG/1
15 TABLET, FILM COATED ORAL NIGHTLY
Status: DISCONTINUED | OUTPATIENT
Start: 2018-05-16 | End: 2018-05-22 | Stop reason: HOSPADM

## 2018-05-16 RX ORDER — LEVOTHYROXINE SODIUM 112 UG/1
112 TABLET ORAL DAILY
COMMUNITY

## 2018-05-16 RX ORDER — GABAPENTIN 300 MG/1
300 CAPSULE ORAL 2 TIMES DAILY
COMMUNITY

## 2018-05-16 RX ORDER — LEVOTHYROXINE SODIUM 112 UG/1
112 TABLET ORAL
Status: DISCONTINUED | OUTPATIENT
Start: 2018-05-16 | End: 2018-05-22 | Stop reason: HOSPADM

## 2018-05-16 RX ORDER — MIRTAZAPINE 15 MG/1
15 TABLET, FILM COATED ORAL NIGHTLY
COMMUNITY

## 2018-05-16 RX ORDER — ACETAMINOPHEN 325 MG/1
650 TABLET ORAL EVERY 6 HOURS PRN
Status: DISCONTINUED | OUTPATIENT
Start: 2018-05-16 | End: 2018-05-22 | Stop reason: HOSPADM

## 2018-05-16 RX ORDER — BUPROPION HYDROCHLORIDE 150 MG/1
150 TABLET, EXTENDED RELEASE ORAL 2 TIMES DAILY
COMMUNITY

## 2018-05-16 RX ORDER — DEXMEDETOMIDINE HYDROCHLORIDE 4 UG/ML
.2-1.5 INJECTION, SOLUTION INTRAVENOUS
Status: DISCONTINUED | OUTPATIENT
Start: 2018-05-16 | End: 2018-05-17

## 2018-05-16 RX ORDER — BUPROPION HYDROCHLORIDE 150 MG/1
150 TABLET, EXTENDED RELEASE ORAL 2 TIMES DAILY
Status: DISCONTINUED | OUTPATIENT
Start: 2018-05-16 | End: 2018-05-22 | Stop reason: HOSPADM

## 2018-05-16 RX ORDER — GABAPENTIN 300 MG/1
300 CAPSULE ORAL 2 TIMES DAILY
Status: DISCONTINUED | OUTPATIENT
Start: 2018-05-16 | End: 2018-05-22 | Stop reason: HOSPADM

## 2018-05-16 RX ORDER — ALBUMIN, HUMAN INJ 5% 5 %
25 SOLUTION INTRAVENOUS ONCE
Status: DISCONTINUED | OUTPATIENT
Start: 2018-05-16 | End: 2018-05-22 | Stop reason: HOSPADM

## 2018-05-16 RX ORDER — ALBUMIN, HUMAN INJ 5% 5 %
SOLUTION INTRAVENOUS
Status: COMPLETED
Start: 2018-05-16 | End: 2018-05-16

## 2018-05-16 RX ADMIN — CHLORHEXIDINE GLUCONATE 15 ML: 1.2 RINSE ORAL at 20:16

## 2018-05-16 RX ADMIN — BUPROPION HYDROCHLORIDE 150 MG: 150 TABLET, EXTENDED RELEASE ORAL at 20:15

## 2018-05-16 RX ADMIN — Medication: at 15:04

## 2018-05-16 RX ADMIN — Medication 25 MCG/HR: at 01:58

## 2018-05-16 RX ADMIN — DIATRIZOATE MEGLUMINE AND DIATRIZOATE SODIUM 120 ML: 660; 100 LIQUID ORAL; RECTAL at 10:25

## 2018-05-16 RX ADMIN — GABAPENTIN 300 MG: 300 CAPSULE ORAL at 14:53

## 2018-05-16 RX ADMIN — DIATRIZOATE MEGLUMINE AND DIATRIZOATE SODIUM 120 ML: 660; 100 LIQUID ORAL; RECTAL at 15:03

## 2018-05-16 RX ADMIN — IOPAMIDOL 95 ML: 612 INJECTION, SOLUTION INTRAVENOUS at 16:12

## 2018-05-16 RX ADMIN — DEXMEDETOMIDINE HYDROCHLORIDE 0.6 MCG/KG/HR: 4 INJECTION, SOLUTION INTRAVENOUS at 20:14

## 2018-05-16 RX ADMIN — HEPARIN SODIUM 5000 UNITS: 5000 INJECTION, SOLUTION INTRAVENOUS; SUBCUTANEOUS at 08:00

## 2018-05-16 RX ADMIN — ALBUMIN (HUMAN): 0.05 SOLUTION INTRAVENOUS at 02:38

## 2018-05-16 RX ADMIN — IPRATROPIUM BROMIDE AND ALBUTEROL SULFATE 3 ML: 2.5; .5 SOLUTION RESPIRATORY (INHALATION) at 07:38

## 2018-05-16 RX ADMIN — IPRATROPIUM BROMIDE AND ALBUTEROL SULFATE 3 ML: 2.5; .5 SOLUTION RESPIRATORY (INHALATION) at 00:52

## 2018-05-16 RX ADMIN — MIRTAZAPINE 15 MG: 15 TABLET, FILM COATED ORAL at 20:15

## 2018-05-16 RX ADMIN — LEVOTHYROXINE SODIUM 112 MCG: 112 TABLET ORAL at 14:53

## 2018-05-16 RX ADMIN — IPRATROPIUM BROMIDE AND ALBUTEROL SULFATE 3 ML: 2.5; .5 SOLUTION RESPIRATORY (INHALATION) at 18:56

## 2018-05-16 RX ADMIN — PANTOPRAZOLE SODIUM 40 MG: 40 INJECTION, POWDER, FOR SOLUTION INTRAVENOUS at 05:52

## 2018-05-16 RX ADMIN — METHYLPREDNISOLONE SODIUM SUCCINATE 40 MG: 40 INJECTION, POWDER, FOR SOLUTION INTRAMUSCULAR; INTRAVENOUS at 08:00

## 2018-05-16 RX ADMIN — GABAPENTIN 300 MG: 300 CAPSULE ORAL at 20:15

## 2018-05-16 RX ADMIN — LEVOFLOXACIN 750 MG: 5 INJECTION, SOLUTION INTRAVENOUS at 22:44

## 2018-05-16 RX ADMIN — METHYLPREDNISOLONE SODIUM SUCCINATE 40 MG: 40 INJECTION, POWDER, FOR SOLUTION INTRAMUSCULAR; INTRAVENOUS at 20:15

## 2018-05-16 RX ADMIN — CEFTRIAXONE SODIUM 1 G: 1 INJECTION, SOLUTION INTRAVENOUS at 20:15

## 2018-05-16 RX ADMIN — IPRATROPIUM BROMIDE AND ALBUTEROL SULFATE 3 ML: 2.5; .5 SOLUTION RESPIRATORY (INHALATION) at 12:47

## 2018-05-16 RX ADMIN — DEXMEDETOMIDINE HYDROCHLORIDE 0.4 MCG/KG/HR: 4 INJECTION, SOLUTION INTRAVENOUS at 12:41

## 2018-05-16 RX ADMIN — BUPROPION HYDROCHLORIDE 150 MG: 150 TABLET, EXTENDED RELEASE ORAL at 14:53

## 2018-05-16 RX ADMIN — ENOXAPARIN SODIUM 40 MG: 40 INJECTION SUBCUTANEOUS at 14:54

## 2018-05-16 RX ADMIN — CHLORHEXIDINE GLUCONATE 15 ML: 1.2 RINSE ORAL at 08:01

## 2018-05-16 NOTE — PROGRESS NOTES
Clinical Nutrition Note      Patient Name: Lalita Valentino  MRN: [id number]  Admission date: 5/15/2018      Reason for Assessment:  Multidisciplinary Rounds    Additional information obtained during MDR:  RN reports pt ready for extubation; although intubated she was on 6.0 L  O2 not mechanically ventilated on arrival. Pt c/o of abdominal pain; distended and tense, CT ordered. Orders given for extubation.    Current diet: NPO Diet    Pertinent medical data reviewed    Intervention:  Plan of care and goals reviewed    Monitor:  RD to follow per protocol      Janiya Little RD  05/16/18 11:49 AM  Time: 20min

## 2018-05-16 NOTE — PLAN OF CARE
Problem: Patient Care Overview  Goal: Plan of Care Review  Outcome: Ongoing (interventions implemented as appropriate)   05/16/18 0516   Coping/Psychosocial   Plan of Care Reviewed With patient;family   Plan of Care Review   Progress improving       Problem: Skin Injury Risk (Adult)  Goal: Identify Related Risk Factors and Signs and Symptoms  Outcome: Ongoing (interventions implemented as appropriate)   05/16/18 0516   Skin Injury Risk (Adult)   Related Risk Factors (Skin Injury Risk) critical care admission     Goal: Skin Health and Integrity  Outcome: Ongoing (interventions implemented as appropriate)   05/16/18 0516   Skin Injury Risk (Adult)   Skin Health and Integrity making progress toward outcome       Problem: Ventilation, Mechanical Invasive (Adult)  Goal: Signs and Symptoms of Listed Potential Problems Will be Absent, Minimized or Managed (Ventilation, Mechanical Invasive)  Outcome: Ongoing (interventions implemented as appropriate)   05/16/18 0516   Goal/Outcome Evaluation   Problems Assessed (Mechanical Ventilation, Invasive) all   Problems Present (Mech Vent, Invasive) none

## 2018-05-16 NOTE — PLAN OF CARE
Problem: Ventilation, Mechanical Invasive (Adult)  Intervention: Prevent Airway Displacement/Mechanical Dysfunction   05/16/18 0547   Prevent Airway Displacement/Mechanical Dysfunction   Airway Safety Measures manual resuscitator/mask/valve in room;suction at bedside     Intervention: Optimize Oxygenation/Ventilation   05/16/18 0547   Respiratory Interventions   Airway/Ventilation Management airway patency maintained;calming measures promoted;humidification applied;pulmonary hygiene promoted

## 2018-05-16 NOTE — PROGRESS NOTES
INTENSIVIST / PULMONARY FOLLOW UP NOTE     Hospital:  LOS: 1 day   Ms. Lalita Valentino, 52 y.o. female is followed for:   Active Problems:    Acute on chronic respiratory failure with hypoxia and hypercapnia    Chronic obstructive pulmonary disease with acute exacerbation    Anxiety and depression    Hypothyroid    Pneumonia due to infectious organism          SUBJECTIVE   Doing well on PS this morning, plan to extubate    The patient's relevant past medical, surgical, family, and social history were reviewed    Allergies and medications were reviewed    ROS:  Per subjective, all other systems were reviewed and were negative        OBJECTIVE     Vital Sign Min/Max for last 24 hours:  Temp  Min: 96.4 °F (35.8 °C)  Max: 98.6 °F (37 °C)   BP  Min: 78/49  Max: 135/73   Pulse  Min: 73  Max: 118   Resp  Min: 13  Max: 23   SpO2  Min: 93 %  Max: 100 %   No Data Recorded     Physical Exam:  General Appearance:  Intubated, in no acute distress  Eyes:  No scleral icterus or pallor, pupils normal  Ears, Nose, Mouth, Throat:  Atraumatic, oral endotracheal tube  Neck:  Trachea midline, thyroid normal  Respiratory:  Clear to auscultation bilaterally, normal effort  Cardiovascular:  Regular rate and rhythm, no murmurs, no peripheral edema  Gastrointestinal:  Abdomen firm, tender, distended, no hepatosplenomegaly  Skin:  Normal temperature, no rash  Psychiatric:  Intubated, sedated, no agitation  Neuro:  No new focal neurologic deficits observed      Telemetry:              Hemodynamics:   CVP:     PAP:     PAOP:     CO:     CI:     SVI:     SVR:       SpO2: 97 % SpO2  Min: 93 %  Max: 100 %   Device:      Flow Rate:   No Data Recorded     Mechanical Ventilator Settings:       Vt (Set, L): 0.4 L    Resp Rate (Set): 12 Resp Rate (Observed) Vent: 17   FiO2 (%): 35 %    PEEP/CPAP (cm H2O): 5 cm H20      Minute Ventilation (L/min) (Obs): 6.85 L/min    I:E Ratio (Obs): 1:3.60     Intake/Ouptut 24 hrs (7:00AM - 6:59 AM)  Intake & Output  (last 3 days)       05/13 0701 - 05/14 0700 05/14 0701 - 05/15 0700 05/15 0701 - 05/16 0700 05/16 0701 - 05/17 0700    I.V. (mL/kg)   741.7 (10.7) 403.6 (5.8)    IV Piggyback   1250.5     Total Intake(mL/kg)   1992.2 (28.7) 403.6 (5.8)    Urine (mL/kg/hr)   1000 320 (0.9)    Emesis/NG output   350     Total Output     1350 320    Net     +642.2 +83.6                  Lines, Drains & Airways    Active LDAs     Name:   Placement date:   Placement time:   Site:   Days:    Peripheral IV 05/15/18 1854 Right;Lower Arm  05/15/18    1854    Arm    less than 1    Peripheral IV 05/15/18 2200 Left Forearm  05/15/18    2200    Forearm    less than 1    Peripheral IV 05/15/18 1900 Right Forearm  05/15/18    1900    Forearm    less than 1    NG/OG Tube Nasogastric Left nostril  05/15/18    1901    Left nostril    less than 1    Urethral Catheter Temperature probe 16 Fr.  05/15/18    2200      less than 1                Hematology:    Results from last 7 days  Lab Units 05/16/18  0523 05/15/18  1921   WBC 10*3/mm3 12.13* 16.26*   HEMOGLOBIN g/dL 10.3* 12.0   HEMATOCRIT % 33.1* 39.9   PLATELETS 10*3/mm3 177 312     Electrolytes, Magnesium and Phosphorus:    Results from last 7 days  Lab Units 05/16/18  0523 05/15/18  1921   SODIUM mmol/L 137 139   CHLORIDE mmol/L 103 103   POTASSIUM mmol/L 4.3 4.7   CO2 mmol/L 29.0 29.0   MAGNESIUM mg/dL 3.0* 1.8   PHOSPHORUS mg/dL 3.4 3.2     Renal:    Results from last 7 days  Lab Units 05/16/18  0523 05/15/18  1921   CREATININE mg/dL 0.80 0.90   BUN mg/dL 23 24*     CrCl cannot be calculated (Unknown ideal weight.).  Hepatic:    Results from last 7 days  Lab Units 05/16/18  0523 05/15/18  1921   ALK PHOS U/L 60 81   BILIRUBIN mg/dL 0.5 0.5   ALT (SGPT) U/L 21 31   AST (SGOT) U/L 18 27     Arterial Blood Gases:    Results from last 7 days  Lab Units 05/16/18  0830 05/16/18  0419 05/15/18  1911   PH, ARTERIAL pH units 7.429 7.412 7.420   PCO2, ARTERIAL mm Hg 44.1 46.5* 44.2   PO2 ART mm Hg 85.4  "76.1* 65.4*   FIO2 % 35 30 30             Lab Results   Component Value Date    LACTATE 1.8 05/16/2018       Relevant imaging studies and labs from 05/16/18 were reviewed and interpreted by me    Medications (drips):         albumin human 25 g Intravenous Once   ceftriaxone 1 g Intravenous Q24H   And      levoFLOXacin 750 mg Intravenous Q24H   chlorhexidine 15 mL Mouth/Throat Q12H   heparin (porcine) 5,000 Units Subcutaneous Q12H   ipratropium-albuterol 3 mL Nebulization Q6H - RT   methylPREDNISolone sodium succinate 40 mg Intravenous Q12H   pantoprazole 40 mg Intravenous Q24H       Assessment/Plan   IMPRESSION / PLAN     Inpatient Problem List:  52 y.o.female:  Hospital Problem List     Acute on chronic respiratory failure with hypoxia and hypercapnia    Chronic obstructive pulmonary disease with acute exacerbation    Anxiety and depression    Hypothyroid    Pneumonia due to infectious organism           Impression:  52 y.o.female with relevant PMH of COPD, anxiety, depression, hypothyroidism, found \"tripoding\" at home by EMS, intubated emergently in [facility] ER admitted 5/15/2018 w/ AECOPD +/- pneumonia.  Significant abdominal distention and pain noted this morning.    Plan:  AECOPD - continue nebs, steroids, abx. F/u cultures.  Extubate.  Bipap prn.    Abdominal Distention - NGT to L[facility], CT Abdomen/Pelvis with IV and oral contrast    Mildly Elevated Troponin - probably some demand ischemia, check repeat in am and Echo    SCDs/Lovenox/PPI    Nutrition - NPO Diet    Plan of care and goals reviewed with mulitdisciplinary team at daily rounds    Critical Care time spent in direct patient care: 30 minutes (excluding procedure time, if applicable) including high complexity decision making to assess, manipulate, and support vital organ system failure in this individual who has impairment of one or more vital organ systems such that there is a high probability of imminent or life threatening deterioration in the " patient’s condition.       Sharath Green MD  Intensive Care Medicine  05/16/18 11:58 AM

## 2018-05-16 NOTE — NURSING NOTE
At 0158, EPIC went down. At 0200 Fentanyl was started at 25 mcg and verified with nurse Meghan Damico RN. At 0227, Dottie HAYWARD was notified of hypotension with SBP in the 80s after a bolus of Albumin 500 ml earlier in the shift and initiation of NS at 100ml/hr. At 0238, Albumin 5% 500 ml was started and completed at 0336. Orders and notes were put in after downtime was completed.

## 2018-05-17 LAB
ALBUMIN SERPL-MCNC: 3.8 G/DL (ref 3.2–4.8)
ALBUMIN/GLOB SERPL: 1.7 G/DL (ref 1.5–2.5)
ALP SERPL-CCNC: 63 U/L (ref 25–100)
ALT SERPL W P-5'-P-CCNC: 20 U/L (ref 7–40)
ANION GAP SERPL CALCULATED.3IONS-SCNC: 6 MMOL/L (ref 3–11)
AST SERPL-CCNC: 17 U/L (ref 0–33)
BASOPHILS # BLD AUTO: 0 10*3/MM3 (ref 0–0.2)
BASOPHILS NFR BLD AUTO: 0 % (ref 0–1)
BH CV ECHO MEAS - AO ROOT AREA (BSA CORRECTED): 1.7
BH CV ECHO MEAS - AO ROOT AREA: 7.3 CM^2
BH CV ECHO MEAS - AO ROOT DIAM: 3 CM
BH CV ECHO MEAS - ASC AORTA: 2.5 CM
BH CV ECHO MEAS - BSA(HAYCOCK): 1.8 M^2
BH CV ECHO MEAS - BSA: 1.8 M^2
BH CV ECHO MEAS - BZI_BMI: 24.7 KILOGRAMS/M^2
BH CV ECHO MEAS - BZI_METRIC_HEIGHT: 167.6 CM
BH CV ECHO MEAS - BZI_METRIC_WEIGHT: 69.4 KG
BH CV ECHO MEAS - CONTRAST EF (2CH): 52.3 ML/M^2
BH CV ECHO MEAS - CONTRAST EF 4CH: 62.5 ML/M^2
BH CV ECHO MEAS - EDV(CUBED): 112.6 ML
BH CV ECHO MEAS - EDV(MOD-SP2): 65 ML
BH CV ECHO MEAS - EDV(MOD-SP4): 80 ML
BH CV ECHO MEAS - EDV(TEICH): 109.1 ML
BH CV ECHO MEAS - EF(CUBED): 63 %
BH CV ECHO MEAS - EF(MOD-SP2): 52.3 %
BH CV ECHO MEAS - EF(MOD-SP4): 62.5 %
BH CV ECHO MEAS - EF(TEICH): 54.4 %
BH CV ECHO MEAS - ESV(CUBED): 41.7 ML
BH CV ECHO MEAS - ESV(MOD-SP2): 31 ML
BH CV ECHO MEAS - ESV(MOD-SP4): 30 ML
BH CV ECHO MEAS - ESV(TEICH): 49.7 ML
BH CV ECHO MEAS - FS: 28.2 %
BH CV ECHO MEAS - IVS/LVPW: 1.1
BH CV ECHO MEAS - IVSD: 0.9 CM
BH CV ECHO MEAS - LAT PEAK E' VEL: 7.4 CM/SEC
BH CV ECHO MEAS - LV DIASTOLIC VOL/BSA (35-75): 44.8 ML/M^2
BH CV ECHO MEAS - LV MASS(C)D: 140.1 GRAMS
BH CV ECHO MEAS - LV MASS(C)DI: 78.5 GRAMS/M^2
BH CV ECHO MEAS - LV SYSTOLIC VOL/BSA (12-30): 16.8 ML/M^2
BH CV ECHO MEAS - LVIDD: 4.8 CM
BH CV ECHO MEAS - LVIDS: 3.5 CM
BH CV ECHO MEAS - LVLD AP2: 7.6 CM
BH CV ECHO MEAS - LVLD AP4: 7.4 CM
BH CV ECHO MEAS - LVLS AP2: 6.4 CM
BH CV ECHO MEAS - LVLS AP4: 6 CM
BH CV ECHO MEAS - LVPWD: 0.9 CM
BH CV ECHO MEAS - MED PEAK E' VEL: 7.41 CM/SEC
BH CV ECHO MEAS - MV A MAX VEL: 86.4 CM/SEC
BH CV ECHO MEAS - MV DEC TIME: 0.16 SEC
BH CV ECHO MEAS - MV E MAX VEL: 72.1 CM/SEC
BH CV ECHO MEAS - MV E/A: 0.83
BH CV ECHO MEAS - PA ACC SLOPE: 547.6 CM/SEC^2
BH CV ECHO MEAS - PA ACC TIME: 0.13 SEC
BH CV ECHO MEAS - PA MAX PG: 2.1 MMHG
BH CV ECHO MEAS - PA PR(ACCEL): 18.8 MMHG
BH CV ECHO MEAS - PA V2 MAX: 72.9 CM/SEC
BH CV ECHO MEAS - RVSP: 31 MMHG
BH CV ECHO MEAS - SI(CUBED): 39.8 ML/M^2
BH CV ECHO MEAS - SI(MOD-SP2): 19 ML/M^2
BH CV ECHO MEAS - SI(MOD-SP4): 28 ML/M^2
BH CV ECHO MEAS - SI(TEICH): 33.2 ML/M^2
BH CV ECHO MEAS - SV(CUBED): 71 ML
BH CV ECHO MEAS - SV(MOD-SP2): 34 ML
BH CV ECHO MEAS - SV(MOD-SP4): 50 ML
BH CV ECHO MEAS - SV(TEICH): 59.3 ML
BH CV ECHO MEAS - TAPSE (>1.6): 2.94 CM2
BH CV ECHO MEASUREMENTS AVERAGE E/E' RATIO: 9.74
BH CV XLRA - RV BASE: 2.8 CM
BH CV XLRA - RV LENGTH: 5.4 CM
BH CV XLRA - RV MID: 1.8 CM
BH CV XLRA - TDI S': 18.8 CM/SEC
BILIRUB SERPL-MCNC: 0.4 MG/DL (ref 0.3–1.2)
BUN BLD-MCNC: 23 MG/DL (ref 9–23)
BUN/CREAT SERPL: 32.9 (ref 7–25)
CALCIUM SPEC-SCNC: 9.1 MG/DL (ref 8.7–10.4)
CHLORIDE SERPL-SCNC: 106 MMOL/L (ref 99–109)
CO2 SERPL-SCNC: 29 MMOL/L (ref 20–31)
CREAT BLD-MCNC: 0.7 MG/DL (ref 0.6–1.3)
DEPRECATED RDW RBC AUTO: 51.1 FL (ref 37–54)
EOSINOPHIL # BLD AUTO: 0 10*3/MM3 (ref 0–0.3)
EOSINOPHIL NFR BLD AUTO: 0 % (ref 0–3)
ERYTHROCYTE [DISTWIDTH] IN BLOOD BY AUTOMATED COUNT: 15.1 % (ref 11.3–14.5)
GFR SERPL CREATININE-BSD FRML MDRD: 87 ML/MIN/1.73
GLOBULIN UR ELPH-MCNC: 2.2 GM/DL
GLUCOSE BLD-MCNC: 122 MG/DL (ref 70–100)
GLUCOSE BLDC GLUCOMTR-MCNC: 118 MG/DL (ref 70–130)
HCT VFR BLD AUTO: 35.6 % (ref 34.5–44)
HGB BLD-MCNC: 10.8 G/DL (ref 11.5–15.5)
IMM GRANULOCYTES # BLD: 0.04 10*3/MM3 (ref 0–0.03)
IMM GRANULOCYTES NFR BLD: 0.4 % (ref 0–0.6)
LEFT ATRIUM VOLUME INDEX: 18.1 ML/M2
LV EF 2D ECHO EST: 56 %
LYMPHOCYTES # BLD AUTO: 0.38 10*3/MM3 (ref 0.6–4.8)
LYMPHOCYTES NFR BLD AUTO: 3.6 % (ref 24–44)
MAGNESIUM SERPL-MCNC: 2.2 MG/DL (ref 1.3–2.7)
MAXIMAL PREDICTED HEART RATE: 166 BPM
MCH RBC QN AUTO: 28.1 PG (ref 27–31)
MCHC RBC AUTO-ENTMCNC: 30.3 G/DL (ref 32–36)
MCV RBC AUTO: 92.5 FL (ref 80–99)
MONOCYTES # BLD AUTO: 0.38 10*3/MM3 (ref 0–1)
MONOCYTES NFR BLD AUTO: 3.6 % (ref 0–12)
NEUTROPHILS # BLD AUTO: 9.87 10*3/MM3 (ref 1.5–8.3)
NEUTROPHILS NFR BLD AUTO: 92.8 % (ref 41–71)
PHOSPHATE SERPL-MCNC: 3.9 MG/DL (ref 2.4–5.1)
PLATELET # BLD AUTO: 217 10*3/MM3 (ref 150–450)
PMV BLD AUTO: 10.4 FL (ref 6–12)
POTASSIUM BLD-SCNC: 4.5 MMOL/L (ref 3.5–5.5)
PROT SERPL-MCNC: 6 G/DL (ref 5.7–8.2)
RBC # BLD AUTO: 3.85 10*6/MM3 (ref 3.89–5.14)
SODIUM BLD-SCNC: 141 MMOL/L (ref 132–146)
STRESS TARGET HR: 141 BPM
TROPONIN I SERPL-MCNC: 0.01 NG/ML
WBC NRBC COR # BLD: 10.63 10*3/MM3 (ref 3.5–10.8)

## 2018-05-17 PROCEDURE — 94799 UNLISTED PULMONARY SVC/PX: CPT

## 2018-05-17 PROCEDURE — 25010000002 METHYLPREDNISOLONE PER 40 MG: Performed by: INTERNAL MEDICINE

## 2018-05-17 PROCEDURE — 84100 ASSAY OF PHOSPHORUS: CPT | Performed by: INTERNAL MEDICINE

## 2018-05-17 PROCEDURE — 80053 COMPREHEN METABOLIC PANEL: CPT | Performed by: INTERNAL MEDICINE

## 2018-05-17 PROCEDURE — 94760 N-INVAS EAR/PLS OXIMETRY 1: CPT

## 2018-05-17 PROCEDURE — 94640 AIRWAY INHALATION TREATMENT: CPT

## 2018-05-17 PROCEDURE — 85025 COMPLETE CBC W/AUTO DIFF WBC: CPT | Performed by: INTERNAL MEDICINE

## 2018-05-17 PROCEDURE — 82962 GLUCOSE BLOOD TEST: CPT

## 2018-05-17 PROCEDURE — 83735 ASSAY OF MAGNESIUM: CPT | Performed by: INTERNAL MEDICINE

## 2018-05-17 PROCEDURE — 25010000002 ENOXAPARIN PER 10 MG: Performed by: INTERNAL MEDICINE

## 2018-05-17 PROCEDURE — 84484 ASSAY OF TROPONIN QUANT: CPT | Performed by: INTERNAL MEDICINE

## 2018-05-17 PROCEDURE — 99233 SBSQ HOSP IP/OBS HIGH 50: CPT | Performed by: INTERNAL MEDICINE

## 2018-05-17 RX ORDER — FLUCONAZOLE 200 MG/1
200 TABLET ORAL ONCE
Status: COMPLETED | OUTPATIENT
Start: 2018-05-17 | End: 2018-05-17

## 2018-05-17 RX ORDER — AZITHROMYCIN 250 MG/1
250 TABLET, FILM COATED ORAL
Status: DISCONTINUED | OUTPATIENT
Start: 2018-05-17 | End: 2018-05-22 | Stop reason: HOSPADM

## 2018-05-17 RX ORDER — PREDNISONE 20 MG/1
40 TABLET ORAL
Status: DISCONTINUED | OUTPATIENT
Start: 2018-05-17 | End: 2018-05-17

## 2018-05-17 RX ORDER — METHYLPREDNISOLONE SODIUM SUCCINATE 40 MG/ML
20 INJECTION, POWDER, LYOPHILIZED, FOR SOLUTION INTRAMUSCULAR; INTRAVENOUS ONCE
Status: COMPLETED | OUTPATIENT
Start: 2018-05-17 | End: 2018-05-17

## 2018-05-17 RX ORDER — ROFLUMILAST 500 UG/1
500 TABLET ORAL DAILY
Status: DISCONTINUED | OUTPATIENT
Start: 2018-05-17 | End: 2018-05-22 | Stop reason: HOSPADM

## 2018-05-17 RX ORDER — PREDNISONE 20 MG/1
40 TABLET ORAL
Status: DISCONTINUED | OUTPATIENT
Start: 2018-05-18 | End: 2018-05-21

## 2018-05-17 RX ORDER — PANTOPRAZOLE SODIUM 40 MG/1
40 TABLET, DELAYED RELEASE ORAL
Status: DISCONTINUED | OUTPATIENT
Start: 2018-05-18 | End: 2018-05-22 | Stop reason: HOSPADM

## 2018-05-17 RX ADMIN — IPRATROPIUM BROMIDE AND ALBUTEROL SULFATE 3 ML: 2.5; .5 SOLUTION RESPIRATORY (INHALATION) at 00:14

## 2018-05-17 RX ADMIN — GABAPENTIN 300 MG: 300 CAPSULE ORAL at 08:38

## 2018-05-17 RX ADMIN — BUPROPION HYDROCHLORIDE 150 MG: 150 TABLET, EXTENDED RELEASE ORAL at 08:38

## 2018-05-17 RX ADMIN — METHYLPREDNISOLONE SODIUM SUCCINATE 40 MG: 40 INJECTION, POWDER, FOR SOLUTION INTRAMUSCULAR; INTRAVENOUS at 08:38

## 2018-05-17 RX ADMIN — IPRATROPIUM BROMIDE AND ALBUTEROL SULFATE 3 ML: 2.5; .5 SOLUTION RESPIRATORY (INHALATION) at 13:21

## 2018-05-17 RX ADMIN — ACETAMINOPHEN 650 MG: 325 TABLET ORAL at 08:59

## 2018-05-17 RX ADMIN — METHYLPREDNISOLONE SODIUM SUCCINATE 20 MG: 40 INJECTION, POWDER, FOR SOLUTION INTRAMUSCULAR; INTRAVENOUS at 17:12

## 2018-05-17 RX ADMIN — PANTOPRAZOLE SODIUM 40 MG: 40 INJECTION, POWDER, FOR SOLUTION INTRAVENOUS at 05:27

## 2018-05-17 RX ADMIN — ENOXAPARIN SODIUM 40 MG: 40 INJECTION SUBCUTANEOUS at 13:09

## 2018-05-17 RX ADMIN — GABAPENTIN 300 MG: 300 CAPSULE ORAL at 20:05

## 2018-05-17 RX ADMIN — BUPROPION HYDROCHLORIDE 150 MG: 150 TABLET, EXTENDED RELEASE ORAL at 20:05

## 2018-05-17 RX ADMIN — DEXMEDETOMIDINE HYDROCHLORIDE 0.2 MCG/KG/HR: 4 INJECTION, SOLUTION INTRAVENOUS at 08:37

## 2018-05-17 RX ADMIN — IPRATROPIUM BROMIDE AND ALBUTEROL SULFATE 3 ML: 2.5; .5 SOLUTION RESPIRATORY (INHALATION) at 07:07

## 2018-05-17 RX ADMIN — IPRATROPIUM BROMIDE AND ALBUTEROL SULFATE 3 ML: 2.5; .5 SOLUTION RESPIRATORY (INHALATION) at 19:23

## 2018-05-17 RX ADMIN — FLUCONAZOLE 200 MG: 200 TABLET ORAL at 13:09

## 2018-05-17 RX ADMIN — LEVOTHYROXINE SODIUM 112 MCG: 112 TABLET ORAL at 05:27

## 2018-05-17 RX ADMIN — AZITHROMYCIN 250 MG: 250 TABLET, FILM COATED ORAL at 13:09

## 2018-05-17 RX ADMIN — MIRTAZAPINE 15 MG: 15 TABLET, FILM COATED ORAL at 20:05

## 2018-05-17 RX ADMIN — ROFLUMILAST 500 MCG: 500 TABLET ORAL at 13:10

## 2018-05-17 RX ADMIN — CHLORHEXIDINE GLUCONATE 15 ML: 1.2 RINSE ORAL at 08:38

## 2018-05-17 NOTE — PLAN OF CARE
Problem: Patient Care Overview  Goal: Plan of Care Review   05/17/18 0310   OTHER   Outcome Summary VSS remains on 2L O2. Sats decrease to 88% with exertion and noticeable SOB with activity but quickly rebounds. Precedex drip remains on for anxiety. Rested well through PM shift.       Problem: Skin Injury Risk (Adult)  Goal: Identify Related Risk Factors and Signs and Symptoms  Outcome: Outcome(s) achieved Date Met: 05/17/18 05/17/18 0310   Skin Injury Risk (Adult)   Related Risk Factors (Skin Injury Risk) critical care admission;fluid intake inadequate     Goal: Skin Health and Integrity  Outcome: Ongoing (interventions implemented as appropriate)   05/17/18 0310   Skin Injury Risk (Adult)   Skin Health and Integrity making progress toward outcome       Problem: Ventilation, Mechanical Invasive (Adult)  Goal: Signs and Symptoms of Listed Potential Problems Will be Absent, Minimized or Managed (Ventilation, Mechanical Invasive)  Outcome: Ongoing (interventions implemented as appropriate)   05/17/18 0310   Goal/Outcome Evaluation   Problems Assessed (Mechanical Ventilation, Invasive) all   Problems Present (Mech Vent, Invasive) malnutrition

## 2018-05-17 NOTE — PROGRESS NOTES
Continued Stay Note  [facility]     Patient Name: Lalita Valentino  MRN: [id number]  Today's Date: 5/17/2018    Admit Date: 5/15/2018          Discharge Plan     Row Name 05/17/18 0918       Plan    Plan Comments Referral called to [facility] to evaluate for possible transfer.              Discharge Codes    No documentation.       Expected Discharge Date and Time     Expected Discharge Date Expected Discharge Time    May 21, 2018             Art Singh RN

## 2018-05-17 NOTE — PROGRESS NOTES
Clinical Nutrition Note      Patient Name: Lalita Valentino  MRN: [id number]  Admission date: 5/15/2018      Reason for Assessment:  Multidisciplinary Rounds    Additional information obtained during MDR:  RN reports pt doing well on NC this am; MD reports negative CT; start diet.    Current diet: Diet Regular    Pertinent medical data reviewed    Intervention:  Plan of care and goals reviewed    Monitor:  RD to follow per protocol      Janiya Little RD  05/17/18 2:33 PM  Time: 20min

## 2018-05-17 NOTE — PROGRESS NOTES
INTENSIVIST / PULMONARY FOLLOW UP NOTE     Hospital:  LOS: 2 days   Ms. Lalita Valentino, 52 y.o. female is followed for:   Active Problems:    Acute on chronic respiratory failure with hypoxia and hypercapnia    Chronic obstructive pulmonary disease with acute exacerbation    Anxiety and depression    Hypothyroid    Pneumonia due to infectious organism          SUBJECTIVE   Extubated to NC, no complaints    The patient's relevant past medical, surgical, family, and social history were reviewed    Allergies and medications were reviewed    ROS:  Per subjective, all other systems were reviewed and were negative        OBJECTIVE     Vital Sign Min/Max for last 24 hours:  Temp  Min: 97.5 °F (36.4 °C)  Max: 98.4 °F (36.9 °C)   BP  Min: 98/74  Max: 136/53   Pulse  Min: 65  Max: 105   Resp  Min: 16  Max: 24   SpO2  Min: 89 %  Max: 99 %   No Data Recorded     Physical Exam:  General Appearance:  Alert, in no acute distress  Eyes:  No scleral icterus or pallor, pupils normal  Ears, Nose, Mouth, Throat:  Atraumatic, oropharynx clear  Neck:  Trachea midline, thyroid normal  Respiratory:  Clear to auscultation bilaterally, normal effort  Cardiovascular:  Regular rate and rhythm, no murmurs, no peripheral edema  Gastrointestinal:  Soft, non-tender, non-distended, no hepatosplenomegaly  Skin:  Normal temperature, no rash  Psychiatric:  Normal mood and affect, normal judgement and insight  Neuro:  No new focal neurologic deficits observed      Telemetry:              Hemodynamics:   CVP:     PAP:     PAOP:     CO:     CI:     SVI:     SVR:       SpO2: 95 % SpO2  Min: 89 %  Max: 99 %   Device:      Flow Rate:   No Data Recorded       Intake/Ouptut 24 hrs (7:00AM - 6:59 AM)  Intake & Output (last 3 days)       05/14 0701 - 05/15 0700 05/15 0701 - 05/16 0700 05/16 0701 - 05/17 0700 05/17 0701 - 05/18 0700    P.O.    150    I.V. (mL/kg)  741.7 (10.7) 680.1 (9.8) 45.6 (0.7)    IV Piggyback  1250.5 150     Total Intake(mL/kg)  1992.2  (28.7) 830.1 (12) 195.6 (2.8)    Urine (mL/kg/hr)  1000 920 (0.6) 300 (1)    Emesis/NG output  350      Total Output   1350 920 300    Net   +642.2 -89.9 -104.4            Unmeasured Urine Occurrence   1 x           Lines, Drains & Airways    Active LDAs     Name:   Placement date:   Placement time:   Site:   Days:    Peripheral IV 05/15/18 1854 Right;Lower Arm  05/15/18    1854    Arm    less than 1    Peripheral IV 05/15/18 2200 Left Forearm  05/15/18    2200    Forearm    less than 1    Peripheral IV 05/15/18 1900 Right Forearm  05/15/18    1900    Forearm    less than 1    NG/OG Tube Nasogastric Left nostril  05/15/18    1901    Left nostril    less than 1    Urethral Catheter Temperature probe 16 Fr.  05/15/18    2200      less than 1                Hematology:    Results from last 7 days  Lab Units 05/17/18  0413 05/16/18  0523 05/15/18  1921   WBC 10*3/mm3 10.63 12.13* 16.26*   HEMOGLOBIN g/dL 10.8* 10.3* 12.0   HEMATOCRIT % 35.6 33.1* 39.9   PLATELETS 10*3/mm3 217 177 312     Electrolytes, Magnesium and Phosphorus:    Results from last 7 days  Lab Units 05/17/18  0413 05/16/18  0523 05/15/18  1921   SODIUM mmol/L 141 137 139   CHLORIDE mmol/L 106 103 103   POTASSIUM mmol/L 4.5 4.3 4.7   CO2 mmol/L 29.0 29.0 29.0   MAGNESIUM mg/dL 2.2 3.0* 1.8   PHOSPHORUS mg/dL 3.9 3.4 3.2     Renal:    Results from last 7 days  Lab Units 05/17/18  0413 05/16/18  0523 05/15/18  1921   CREATININE mg/dL 0.70 0.80 0.90   BUN mg/dL 23 23 24*     Estimated Creatinine Clearance: 100.7 mL/min (by C-G formula based on SCr of 0.7 mg/dL).  Hepatic:    Results from last 7 days  Lab Units 05/17/18  0413 05/16/18  0523 05/15/18  1921   ALK PHOS U/L 63 60 81   BILIRUBIN mg/dL 0.4 0.5 0.5   ALT (SGPT) U/L 20 21 31   AST (SGOT) U/L 17 18 27     Arterial Blood Gases:    Results from last 7 days  Lab Units 05/16/18  0830 05/16/18  0419 05/15/18  1911   PH, ARTERIAL pH units 7.429 7.412 7.420   PCO2, ARTERIAL mm Hg 44.1 46.5* 44.2   PO2 ART mm  "Hg 85.4 76.1* 65.4*   FIO2 % 35 30 30             Lab Results   Component Value Date    LACTATE 1.8 05/16/2018       Relevant imaging studies and labs from 05/17/18 were reviewed and interpreted by me    Medications (drips):         albumin human 25 g Intravenous Once   buPROPion  mg Oral BID   ceftriaxone 1 g Intravenous Q24H   And      levoFLOXacin 750 mg Intravenous Q24H   chlorhexidine 15 mL Mouth/Throat Q12H   enoxaparin 40 mg Subcutaneous Q24H   gabapentin 300 mg Oral BID   ipratropium-albuterol 3 mL Nebulization Q6H - RT   levothyroxine 112 mcg Oral Q AM   methylPREDNISolone sodium succinate 40 mg Intravenous Q12H   mirtazapine 15 mg Oral Nightly   pantoprazole 40 mg Intravenous Q24H       Assessment/Plan   IMPRESSION / PLAN     Inpatient Problem List:  52 y.o.female:  Hospital Problem List     Acute on chronic respiratory failure with hypoxia and hypercapnia    Chronic obstructive pulmonary disease with acute exacerbation    Anxiety and depression    Hypothyroid    Pneumonia due to infectious organism           Impression:  52 y.o.female with relevant PMH of COPD, anxiety, depression, hypothyroidism, found \"tripoding\" at home by EMS, recurrent admissions for respiratory failure, intubated 4 times in past, who was intubated emergently in [facility] ER admitted 5/15/2018 w/ AECOPD +/- pneumonia.     Plan:  AECOPD - continue nebs, de-escalate pred to 40 daily.  Add chronic daily azithro and roflumilast for recurrent exacerbations.   D/c other abx.    Abdominal Distention - CT Abd/Pelvis neg, feels better today    Mildly Elevated Troponin - probably some demand ischemia from COPD, repeat trop neg.  F/u w/ cardiology as outpatient for WMA.  May need stress test.    Dysphagia - f/u GI as outpatient    SCDs/Lovenox/PPI    Nutrition - Diet Regular    Ambulate    To Tele    Plan of care and goals reviewed with mulitdisciplinary team at daily rounds           Sharath Green MD  Intensive Care Medicine  05/17/18 " 11:27 AM

## 2018-05-18 LAB
ALBUMIN SERPL-MCNC: 4 G/DL (ref 3.2–4.8)
ALBUMIN/GLOB SERPL: 1.7 G/DL (ref 1.5–2.5)
ALP SERPL-CCNC: 65 U/L (ref 25–100)
ALT SERPL W P-5'-P-CCNC: 25 U/L (ref 7–40)
ANION GAP SERPL CALCULATED.3IONS-SCNC: 11 MMOL/L (ref 3–11)
AST SERPL-CCNC: 21 U/L (ref 0–33)
BACTERIA FLD CULT: NORMAL
BACTERIA SPEC RESP CULT: NORMAL
BASOPHILS # BLD AUTO: 0 10*3/MM3 (ref 0–0.2)
BASOPHILS NFR BLD AUTO: 0 % (ref 0–1)
BILIRUB SERPL-MCNC: 0.6 MG/DL (ref 0.3–1.2)
BUN BLD-MCNC: 24 MG/DL (ref 9–23)
BUN/CREAT SERPL: 30 (ref 7–25)
CALCIUM SPEC-SCNC: 9.4 MG/DL (ref 8.7–10.4)
CHLORIDE SERPL-SCNC: 102 MMOL/L (ref 99–109)
CO2 SERPL-SCNC: 29 MMOL/L (ref 20–31)
CREAT BLD-MCNC: 0.8 MG/DL (ref 0.6–1.3)
DEPRECATED RDW RBC AUTO: 52.8 FL (ref 37–54)
EOSINOPHIL # BLD AUTO: 0 10*3/MM3 (ref 0–0.3)
EOSINOPHIL NFR BLD AUTO: 0 % (ref 0–3)
ERYTHROCYTE [DISTWIDTH] IN BLOOD BY AUTOMATED COUNT: 15.7 % (ref 11.3–14.5)
GFR SERPL CREATININE-BSD FRML MDRD: 75 ML/MIN/1.73
GLOBULIN UR ELPH-MCNC: 2.4 GM/DL
GLUCOSE BLD-MCNC: 96 MG/DL (ref 70–100)
GRAM STN SPEC: NORMAL
HCT VFR BLD AUTO: 38.7 % (ref 34.5–44)
HGB BLD-MCNC: 11.9 G/DL (ref 11.5–15.5)
IMM GRANULOCYTES # BLD: 0.04 10*3/MM3 (ref 0–0.03)
IMM GRANULOCYTES NFR BLD: 0.3 % (ref 0–0.6)
L PNEUMO1 AG UR QL IA: NEGATIVE
LYMPHOCYTES # BLD AUTO: 0.44 10*3/MM3 (ref 0.6–4.8)
LYMPHOCYTES NFR BLD AUTO: 3.5 % (ref 24–44)
Lab: NORMAL
MAGNESIUM SERPL-MCNC: 2.1 MG/DL (ref 1.3–2.7)
MCH RBC QN AUTO: 28.3 PG (ref 27–31)
MCHC RBC AUTO-ENTMCNC: 30.7 G/DL (ref 32–36)
MCV RBC AUTO: 92.1 FL (ref 80–99)
MONOCYTES # BLD AUTO: 1.01 10*3/MM3 (ref 0–1)
MONOCYTES NFR BLD AUTO: 8 % (ref 0–12)
NEUTROPHILS # BLD AUTO: 11.24 10*3/MM3 (ref 1.5–8.3)
NEUTROPHILS NFR BLD AUTO: 88.5 % (ref 41–71)
ORGANISM ID: NORMAL
PHOSPHATE SERPL-MCNC: 3.4 MG/DL (ref 2.4–5.1)
PLATELET # BLD AUTO: 236 10*3/MM3 (ref 150–450)
PMV BLD AUTO: 10.3 FL (ref 6–12)
POTASSIUM BLD-SCNC: 4 MMOL/L (ref 3.5–5.5)
PROT SERPL-MCNC: 6.4 G/DL (ref 5.7–8.2)
RBC # BLD AUTO: 4.2 10*6/MM3 (ref 3.89–5.14)
S PNEUM AG SPEC QL LA: NEGATIVE
SODIUM BLD-SCNC: 142 MMOL/L (ref 132–146)
SPECIMEN SOURCE: NORMAL
WBC NRBC COR # BLD: 12.69 10*3/MM3 (ref 3.5–10.8)

## 2018-05-18 PROCEDURE — 97110 THERAPEUTIC EXERCISES: CPT

## 2018-05-18 PROCEDURE — 84100 ASSAY OF PHOSPHORUS: CPT | Performed by: INTERNAL MEDICINE

## 2018-05-18 PROCEDURE — 63710000001 PREDNISONE PER 1 MG: Performed by: INTERNAL MEDICINE

## 2018-05-18 PROCEDURE — 97161 PT EVAL LOW COMPLEX 20 MIN: CPT

## 2018-05-18 PROCEDURE — 80053 COMPREHEN METABOLIC PANEL: CPT | Performed by: INTERNAL MEDICINE

## 2018-05-18 PROCEDURE — 99232 SBSQ HOSP IP/OBS MODERATE 35: CPT | Performed by: FAMILY MEDICINE

## 2018-05-18 PROCEDURE — 25010000002 ENOXAPARIN PER 10 MG: Performed by: INTERNAL MEDICINE

## 2018-05-18 PROCEDURE — 94760 N-INVAS EAR/PLS OXIMETRY 1: CPT

## 2018-05-18 PROCEDURE — 85025 COMPLETE CBC W/AUTO DIFF WBC: CPT | Performed by: INTERNAL MEDICINE

## 2018-05-18 PROCEDURE — 94799 UNLISTED PULMONARY SVC/PX: CPT

## 2018-05-18 PROCEDURE — 83735 ASSAY OF MAGNESIUM: CPT | Performed by: INTERNAL MEDICINE

## 2018-05-18 PROCEDURE — 97165 OT EVAL LOW COMPLEX 30 MIN: CPT

## 2018-05-18 PROCEDURE — 94640 AIRWAY INHALATION TREATMENT: CPT

## 2018-05-18 RX ADMIN — PREDNISONE 40 MG: 20 TABLET ORAL at 09:06

## 2018-05-18 RX ADMIN — IPRATROPIUM BROMIDE AND ALBUTEROL SULFATE 3 ML: 2.5; .5 SOLUTION RESPIRATORY (INHALATION) at 12:25

## 2018-05-18 RX ADMIN — LEVOTHYROXINE SODIUM 112 MCG: 112 TABLET ORAL at 06:16

## 2018-05-18 RX ADMIN — IPRATROPIUM BROMIDE AND ALBUTEROL SULFATE 3 ML: 2.5; .5 SOLUTION RESPIRATORY (INHALATION) at 01:30

## 2018-05-18 RX ADMIN — BUPROPION HYDROCHLORIDE 150 MG: 150 TABLET, EXTENDED RELEASE ORAL at 20:12

## 2018-05-18 RX ADMIN — AZITHROMYCIN 250 MG: 250 TABLET, FILM COATED ORAL at 09:06

## 2018-05-18 RX ADMIN — GABAPENTIN 300 MG: 300 CAPSULE ORAL at 09:06

## 2018-05-18 RX ADMIN — MIRTAZAPINE 15 MG: 15 TABLET, FILM COATED ORAL at 20:12

## 2018-05-18 RX ADMIN — ACETAMINOPHEN 650 MG: 325 TABLET ORAL at 20:25

## 2018-05-18 RX ADMIN — IPRATROPIUM BROMIDE AND ALBUTEROL SULFATE 3 ML: 2.5; .5 SOLUTION RESPIRATORY (INHALATION) at 07:17

## 2018-05-18 RX ADMIN — GABAPENTIN 300 MG: 300 CAPSULE ORAL at 20:12

## 2018-05-18 RX ADMIN — ENOXAPARIN SODIUM 40 MG: 40 INJECTION SUBCUTANEOUS at 17:09

## 2018-05-18 RX ADMIN — BUPROPION HYDROCHLORIDE 150 MG: 150 TABLET, EXTENDED RELEASE ORAL at 09:06

## 2018-05-18 RX ADMIN — ROFLUMILAST 500 MCG: 500 TABLET ORAL at 09:06

## 2018-05-18 RX ADMIN — PANTOPRAZOLE SODIUM 40 MG: 40 TABLET, DELAYED RELEASE ORAL at 06:16

## 2018-05-18 RX ADMIN — IPRATROPIUM BROMIDE AND ALBUTEROL SULFATE 3 ML: 2.5; .5 SOLUTION RESPIRATORY (INHALATION) at 19:19

## 2018-05-18 NOTE — PLAN OF CARE
Problem: Patient Care Overview  Goal: Plan of Care Review  Outcome: Ongoing (interventions implemented as appropriate)   05/18/18 1051   Coping/Psychosocial   Plan of Care Reviewed With patient   OTHER   Outcome Summary PT initial evaluation completed for pt presenting with acute shortness of air and generalized weakness. Pt ambulated 60ft with CGA; limited by increased SOA. Pt's decreased functional independence warrants PT skilled care. Recommend D/C to IP rehab for pulmonary care.

## 2018-05-18 NOTE — THERAPY EVALUATION
Acute Care - Physical Therapy Initial Evaluation  [facility]     Patient Name: Lalita Valentino  : 1965  MRN: [id number]  Today's Date: 2018   Onset of Illness/Injury or Date of Surgery: 05/15/18  Date of Referral to PT: 18  Referring Physician: MD Peter      Admit Date: 5/15/2018    Visit Dx:     ICD-10-CM ICD-9-CM   1. Impaired functional mobility, balance, gait, and endurance Z74.09 V49.89   2. Impaired mobility and ADLs Z74.09 799.89     Patient Active Problem List   Diagnosis   • Acute on chronic respiratory failure with hypoxia and hypercapnia   • Chronic obstructive pulmonary disease with acute exacerbation   • Anxiety and depression   • Hypothyroid   • Pneumonia due to infectious organism     Past Medical History:   Diagnosis Date   • Anxiety    • Depression 5/15/2018   • H/O  section    • Hypothyroid 5/15/2018     History reviewed. No pertinent surgical history.     PT ASSESSMENT (last 12 hours)      Physical Therapy Evaluation     Row Name 18 0814          PT Evaluation Time/Intention    Subjective Information complains of;dyspnea  -KR     Document Type evaluation  -KR     Mode of Treatment physical therapy  -KR     Patient Effort good  -KR     Symptoms Noted During/After Treatment fatigue;shortness of breath  -KR     Row Name 18 0814          General Information    Patient Profile Reviewed? yes  -KR     Onset of Illness/Injury or Date of Surgery 05/15/18  -KR     Referring Physician MD Peter  -KR     Patient Observations alert;cooperative;agree to therapy  -KR     Prior Level of Function independent:;all household mobility;gait;transfer;ADL's;dressing;bathing  -KR     Equipment Currently Used at Home oxygen  -KR     Pertinent History of Current Functional Problem Pt presented to OSH ER with hx of COPD and developed acute shortness of breath. She was tripoding in her home. Bibasal or left greater than R infiltrates were noted to be consistent with acute  pneumonia. Pt intubated and transferred to [facility].   -KR     Existing Precautions/Restrictions fall;oxygen therapy device and L/min  -KR     Risks Reviewed patient:;LOB;dizziness;increased discomfort;change in vital signs  -KR     Benefits Reviewed patient:;improve function;increase independence;increase strength;increase balance  -KR     Barriers to Rehab none identified  -KR     Row Name 05/18/18 0814          Relationship/Environment    Lives With alone  -KR     Row Name 05/18/18 0814          Resource/Environmental Concerns    Current Living Arrangements home/apartment/condo  -KR     Resource/Environmental Concerns none  -KR     Transportation Concerns car, none  -KR     Row Name 05/18/18 0814          Cognitive Assessment/Interventions    Additional Documentation Cognitive Assessment/Intervention (Group)  -KR     Row Name 05/18/18 0814          Cognitive Assessment/Intervention- PT/OT    Affect/Mental Status (Cognitive) WFL  -KR     Orientation Status (Cognition) oriented x 4  -KR     Follows Commands (Cognition) WFL  -KR     Cognitive Function (Cognitive) safety deficit  -KR     Safety Deficit (Cognitive) mild deficit;ability to follow commands;awareness of need for assistance;insight into deficits/self awareness;safety precautions awareness  -KR     Personal Safety Interventions fall prevention program maintained;gait belt;nonskid shoes/slippers when out of bed  -KR     Row Name 05/18/18 0814          Safety Issues, Functional Mobility    Impairments Affecting Function (Mobility) balance;endurance/activity tolerance;shortness of breath;strength  -KR     Row Name 05/18/18 0814          Bed Mobility Assessment/Treatment    Bed Mobility Assessment/Treatment supine-sit  -KR     Supine-Sit Delta (Bed Mobility) supervision  -KR     Assistive Device (Bed Mobility) head of bed elevated  -KR     Comment (Bed Mobility) Pt demonstrated good safety awareness with bed mobility.   -KR     Row Name 05/18/18 0814           Transfer Assessment/Treatment    Transfer Assessment/Treatment sit-stand transfer;stand-sit transfer;toilet transfer  -KR     Comment (Transfers) VC's for sequencing and safety awareness.   -KR     Sit-Stand Rolette (Transfers) contact guard;verbal cues  -KR     Stand-Sit Rolette (Transfers) contact guard;verbal cues  -KR     Rolette Level (Toilet Transfer) contact guard;verbal cues  -KR     Assistive Device (Toilet Transfer) commode;grab bars/safety frame  -KR     Row Name 05/18/18 0814          Toilet Transfer    Type (Toilet Transfer) sit-stand;stand-sit  -KR     Row Name 05/18/18 0814          Gait/Stairs Assessment/Training    Gait/Stairs Assessment/Training gait/ambulation independence  -KR     Rolette Level (Gait) contact guard;verbal cues  -KR     Distance in Feet (Gait) 60  -KR     Pattern (Gait) step-through  -KR     Deviations/Abnormal Patterns (Gait) base of support, narrow;clarissa decreased;other (see comments)   decreased step length  -KR     Bilateral Gait Deviations heel strike decreased  -KR     Comment (Gait/Stairs) Pt demonstrated step through gait pattern with slow clarissa and decreased step length. Pt required one standing rest break due to SOA.   -KR     Row Name 05/18/18 0814          General ROM    GENERAL ROM COMMENTS BLE WFL   -KR     Row Name 05/18/18 0814          MMT (Manual Muscle Testing)    Additional Documentation General Assessment (Manual Muscle Testing) (Group)  -KR     Row Name 05/18/18 0814          General Assessment (Manual Muscle Testing)    General Manual Muscle Testing (MMT) Assessment lower extremity strength deficits identified  -KR     Comment, General Manual Muscle Testing (MMT) Assessment BLE grossly 4-/5  -KR     Row Name 05/18/18 0814          Motor Assessment/Intervention    Additional Documentation Balance (Group)  -KR     Row Name 05/18/18 0814          Balance    Balance static sitting balance;static standing balance  -KR     Row Name  05/18/18 0814          Static Sitting Balance    Level of Tye (Unsupported Sitting, Static Balance) independent  -KR     Sitting Position (Unsupported Sitting, Static Balance) sitting on edge of bed  -KR     Row Name 05/18/18 0814          Static Standing Balance    Level of Tye (Supported Standing, Static Balance) supervision  -     Row Name 05/18/18 0814          Sensory Assessment/Intervention    Sensory General Assessment no sensation deficits identified  -     Row Name 05/18/18 0814          Pain Assessment    Additional Documentation Pain Scale: Numbers Pre/Post-Treatment (Group)  -KR     Row Name 05/18/18 0814          Pain Scale: Numbers Pre/Post-Treatment    Pain Scale: Numbers, Pretreatment 0/10 - no pain  -KR     Pain Scale: Numbers, Post-Treatment 0/10 - no pain  -KR     Row Name 05/18/18 0814          Plan of Care Review    Plan of Care Reviewed With patient  -KR     [facility] Name 05/18/18 0814          Physical Therapy Clinical Impression    Date of Referral to PT 05/17/18  -KR     PT Diagnosis (PT Clinical Impression) impaired functional mobility  -KR     Patient/Family Goals Statement (PT Clinical Impression) return to PLOF  -KR     Criteria for Skilled Interventions Met (PT Clinical Impression) yes;treatment indicated  -KR     Rehab Potential (PT Clinical Summary) good, to achieve stated therapy goals  -KR     Care Plan Review (PT) evaluation/treatment results reviewed;risks/benefits reviewed;patient/other agree to care plan  -     Row Name 05/18/18 0814          Vital Signs    Pre Systolic BP Rehab 153  -KR     Pre Treatment Diastolic   -KR     Pretreatment Heart Rate (beats/min) 114  -KR     Posttreatment Heart Rate (beats/min) 102  -KR     Pre SpO2 (%) 99  -KR     O2 Delivery Pre Treatment supplemental O2  -KR     Post SpO2 (%) 92  -KR     O2 Delivery Post Treatment supplemental O2  -KR     Pre Patient Position Supine  -KR     Intra Patient Position Standing  -KR     Post  Patient Position Sitting  -KR     Row Name 05/18/18 0814          Physical Therapy Goals    Bed Mobility Goal Selection (PT) bed mobility, PT goal 1  -KR     Transfer Goal Selection (PT) transfer, PT goal 1  -KR     Gait Training Goal Selection (PT) gait training, PT goal 1  -KR     Row Name 05/18/18 0814          Bed Mobility Goal 1 (PT)    Activity/Assistive Device (Bed Mobility Goal 1, PT) bed mobility activities, all  -KR     Benzie Level/Cues Needed (Bed Mobility Goal 1, PT) independent  -KR     Time Frame (Bed Mobility Goal 1, PT) 2 weeks  -KR     Progress/Outcomes (Bed Mobility Goal 1, PT) goal ongoing  -KR     Row Name 05/18/18 0814          Transfer Goal 1 (PT)    Activity/Assistive Device (Transfer Goal 1, PT) sit-to-stand/stand-to-sit  -KR     Benzie Level/Cues Needed (Transfer Goal 1, PT) independent  -KR     Time Frame (Transfer Goal 1, PT) 2 weeks  -KR     Progress/Outcome (Transfer Goal 1, PT) goal ongoing  -KR     Row Name 05/18/18 0814          Gait Training Goal 1 (PT)    Activity/Assistive Device (Gait Training Goal 1, PT) gait (walking locomotion)  -KR     Benzie Level (Gait Training Goal 1, PT) independent  -KR     Distance (Gait Goal 1, PT) 300 feet  -KR     Time Frame (Gait Training Goal 1, PT) 2 weeks  -KR     Progress/Outcome (Gait Training Goal 1, PT) goal ongoing  -KR     Row Name 05/18/18 0814          Positioning and Restraints    Pre-Treatment Position in bed  -KR     Post Treatment Position chair  -KR     In Chair notified nsg;sitting;call light within reach;encouraged to call for assist  -KR     Row Name 05/18/18 0814          Living Environment    Home Accessibility tub/shower is not walk in  -KR       User Key  (r) = Recorded By, (t) = Taken By, (c) = Cosigned By    Initials Name Provider Type    YOMI Stephenson PT Physical Therapist          Physical Therapy Education     Title: PT OT SLP Therapies (Active)     Topic: Physical Therapy (Active)     Point:  Mobility training (Active)    Learning Progress Summary     Learner Status Readiness Method Response Comment Documented by    Patient Active Acceptance E NR  KR 05/18/18 1052          Point: Body mechanics (Active)    Learning Progress Summary     Learner Status Readiness Method Response Comment Documented by    Patient Active Acceptance E NR  KR 05/18/18 1052          Point: Precautions (Active)    Learning Progress Summary     Learner Status Readiness Method Response Comment Documented by    Patient Active Acceptance E NR  KR 05/18/18 1052                      User Key     Initials Effective Dates Name Provider Type Discipline    KR 04/03/18 -  Marija Stephenson, PT Physical Therapist PT                PT Recommendation and Plan  Anticipated Discharge Disposition (PT): inpatient rehab facility  Planned Therapy Interventions (PT Eval): balance training, bed mobility training, gait training, strengthening, transfer training  Therapy Frequency (PT Clinical Impression): daily  Outcome Summary/Treatment Plan (PT)  Anticipated Discharge Disposition (PT): inpatient rehab facility  Plan of Care Reviewed With: patient  Outcome Summary: PT initial evaluation completed for pt presenting with acute shortness of air and generalized weakness. Pt ambulated 60ft with CGA; limited by increased SOA. Pt's decreased functional independence warrants PT skilled care. Recommend D/C to IP rehab for pulmonary care.           Outcome Measures     Row Name 05/18/18 0844 05/18/18 0814          How much help from another person do you currently need...    Turning from your back to your side while in flat bed without using bedrails?  -- 3  -KR     Moving from lying on back to sitting on the side of a flat bed without bedrails?  -- 3  -KR     Moving to and from a bed to a chair (including a wheelchair)?  -- 3  -KR     Standing up from a chair using your arms (e.g., wheelchair, bedside chair)?  -- 3  -KR     Climbing 3-5 steps with a railing?  -- 2   -KR     To walk in hospital room?  -- 3  -KR     AM-PAC 6 Clicks Score  -- 17  -KR        How much help from another is currently needed...    Putting on and taking off regular lower body clothing? 3  -DANA  --     Bathing (including washing, rinsing, and drying) 3  -DANA  --     Toileting (which includes using toilet bed pan or urinal) 3  -DANA  --     Putting on and taking off regular upper body clothing 3  -DANA  --     Taking care of personal grooming (such as brushing teeth) 3  -DANA  --     Eating meals 4  -DANA  --     Score 19  -DNAA  --        Functional Assessment    Outcome Measure Options AM-PAC 6 Clicks Daily Activity (OT)  -DANA AM-PAC 6 Clicks Basic Mobility (PT)  -KR       User Key  (r) = Recorded By, (t) = Taken By, (c) = Cosigned By    Initials Name Provider Type    DANA Winkler, OT Occupational Therapist    YOMI Stephenson, PT Physical Therapist           Time Calculation:         PT Charges     Row Name 05/18/18 1055             Time Calculation    Start Time 0814  -KR      PT Received On 05/18/18  -KR      PT Goal Re-Cert Due Date 05/28/18  -KR         Time Calculation- PT    Total Timed Code Minutes- PT 9 minute(s)  -KR        User Key  (r) = Recorded By, (t) = Taken By, (c) = Cosigned By    Initials Name Provider Type    YOMI Stephenson, PT Physical Therapist          Therapy Charges for Today     Code Description Service Date Service Provider Modifiers Qty    66608787476 HC PT EVAL LOW COMPLEXITY 4 5/18/2018 Marija Stephenson, PT GP 1    21995054377 HC PT THER PROC EA 15 MIN 5/18/2018 Marija Stephenson, PT GP 1    49085515847 HC PT THER SUPP EA 15 MIN 5/18/2018 Marija Stephenson, PT GP 3          PT G-Codes  Outcome Measure Options: AM-PAC 6 Clicks Daily Activity (OT)      Yasmine Stephenson, PT  5/18/2018

## 2018-05-18 NOTE — PROGRESS NOTES
Continued Stay Note  [facility]     Patient Name: Lalita Valentino  MRN: [id number]  Today's Date: 5/18/2018    Admit Date: 5/15/2018    Consent obtained for the participation in the [facility]itions Program.  Margarita Garcia RN        Discharge Plan     Row Name 05/18/18 1124       Plan    Plan update    Patient/Family in Agreement with Plan yes    Plan Comments Spoke to [facility] with [facility] who is looking at referal and will contact  regarding bed.  Spoke with patient at bedside regarding discharge plan.  Patient in agreement with plan to [facility].  CM following.  Patient plan is to discharge to [facility] via car with family to transport when medically ready and bed available.      Final Discharge Disposition Code 62 - inpatient rehab facility              Discharge Codes    No documentation.       Expected Discharge Date and Time     Expected Discharge Date Expected Discharge Time    May 21, 2018             Margarita Garcia RN

## 2018-05-18 NOTE — PLAN OF CARE
Problem: Patient Care Overview  Goal: Plan of Care Review  Outcome: Ongoing (interventions implemented as appropriate)   05/18/18 1027   Coping/Psychosocial   Plan of Care Reviewed With patient   Plan of Care Review   Progress no change   OTHER   Outcome Summary OT evaluation completed. Pt. demosntrating impaired strength and activity tolerance. OT to educate on energy conservation and work toward improved activity tolerance/strength. Pt. may be appropriate for pulmonary rehab.

## 2018-05-18 NOTE — THERAPY EVALUATION
Acute Care - Occupational Therapy Initial Evaluation  [facility]     Patient Name: Lalita Valentino  : 1965  MRN: [id number]  Today's Date: 2018  Onset of Illness/Injury or Date of Surgery: 05/15/18  Date of Referral to OT: 18  Referring Physician: MD Peter    Admit Date: 5/15/2018       ICD-10-CM ICD-9-CM   1. Impaired functional mobility, balance, gait, and endurance Z74.09 V49.89   2. Impaired mobility and ADLs Z74.09 799.89     Patient Active Problem List   Diagnosis   • Acute on chronic respiratory failure with hypoxia and hypercapnia   • Chronic obstructive pulmonary disease with acute exacerbation   • Anxiety and depression   • Hypothyroid   • Pneumonia due to infectious organism     Past Medical History:   Diagnosis Date   • Anxiety    • Depression 5/15/2018   • H/O  section    • Hypothyroid 5/15/2018     History reviewed. No pertinent surgical history.       OT ASSESSMENT FLOWSHEET (last 72 hours)      Occupational Therapy Evaluation     Row Name 18 0844                   OT Evaluation Time/Intention    Subjective Information complains of;pain;weakness;dyspnea  -DANA        Document Type evaluation  -DANA        Mode of Treatment individual therapy;occupational therapy  -DANA        Patient Effort good  -DANA        Symptoms Noted During/After Treatment fatigue;shortness of breath  -DANA           General Information    Patient Profile Reviewed? yes  -DANA        Onset of Illness/Injury or Date of Surgery 05/15/18  -DANA        Referring Physician MD Peter  -DANA        Patient Observations alert;cooperative;agree to therapy  -DANA        Patient/Family Observations No family present.  -DANA        General Observations of Patient Pt. sitting up in chair 1 L 02, telemetry.  -DANA        Prior Level of Function independent:;all household mobility;ADL's;cooking;mod assist:;home management;dependent:;shopping;driving   Per pt. assist with heavy HM task, SOA with showering.  -DANA         Equipment Currently Used at Home oxygen  -DANA        Pertinent History of Current Functional Problem Pt. presented from OSH ER with hx of COPD adn with SOA.  Test showed acute PNA.  Pt. intubated and transferred to [facility].  A 0n C Respitory failure, COPD exacerbation, PNA.  -DANA        Existing Precautions/Restrictions fall;oxygen therapy device and L/min  -DANA        Risks Reviewed patient:;LOB;increased discomfort;change in vital signs  -DANA        Benefits Reviewed patient:;improve function;increase independence;increase strength;increase knowledge  -DANA           Relationship/Environment    Primary Source of Support/Comfort child(jacqui)  -DANA        Lives With alone   dtr and grandchildren close by assist to shop and HM tasks  -DANA           Resource/Environmental Concerns    Current Living Arrangements home/apartment/condo  -DANA           Cognitive Assessment/Intervention- PT/OT    Affect/Mental Status (Cognitive) WFL  -DANA        Orientation Status (Cognition) oriented x 4  -DANA        Follows Commands (Cognition) WFL  -DANA        Safety Deficit (Cognitive) mild deficit  -DANA        Personal Safety Interventions fall prevention program maintained;gait belt;nonskid shoes/slippers when out of bed  -DANA           Safety Issues, Functional Mobility    Impairments Affecting Function (Mobility) endurance/activity tolerance;strength  -DANA           Bed Mobility Assessment/Treatment    Comment (Bed Mobility) Pt. UIC on arrival with Supervison bed mobility per PT note.  -DANA           Functional Mobility    Functional Mobility- Ind. Level contact guard assist   gait belt use.  -DANA        Functional Mobility-Distance (Feet) 4  -DANA        Functional Mobility- Comment deferred minus two steps forward and back due to PT had just recently seen pt.  -DANA           Transfer Assessment/Treatment    Transfer Assessment/Treatment sit-stand transfer;stand-sit transfer  -DANA        Sit-Stand [place] (Transfers) contact guard  -DANA        Stand-Sit  Comfort (Transfers) contact guard  -DANA           Sit-Stand Transfer    Assistive Device (Sit-Stand Transfers) --   gait belt on  -DANA           Stand-Sit Transfer    Assistive Device (Stand-Sit Transfers) --   gait belt  -DANA           ADL Assessment/Intervention    BADL Assessment/Intervention lower body dressing  -DANA           Lower Body Dressing Assessment/Training    Lower Body Dressing Comfort Level doff;don;socks;independent  -DANA        Lower Body Dressing Position supported sitting  -DANA        Comment (Lower Body Dressing) Pt. with fatigue and SOA with completion.  -DANA           BADL Safety/Performance    Impairments, BADL Safety/Performance endurance/activity tolerance;strength;shortness of breath  -DANA        Skilled BADL Treatment/Intervention energy conservation   planned  -DANA           General ROM    GENERAL ROM COMMENTS BUE WFL  -DANA           General Assessment (Manual Muscle Testing)    Comment, General Manual Muscle Testing (MMT) Assessment BLE grossly 3+ to 4/5  -DANA           Balance    Balance dynamic sitting balance;dynamic standing balance  -DANA           Dynamic Sitting Balance    Level of Comfort, Reaches Outside Midline (Sitting, Dynamic Balance) independent  -DANA        Sitting Position, Reaches Outside Midline (Sitting, Dynamic Balance) sitting in chair   dressing task  -DANA           Static Standing Balance    Level of Comfort (Supported Standing, Static Balance) supervision  -DANA           Dynamic Standing Balance    Level of Comfort, Reaches Outside Midline (Standing, Dynamic Balance) contact guard assist   mini squats and high march x 5 reps, with SOA  -DANA           Sensory Assessment/Intervention    Sensory General Assessment no sensation deficits identified  -DANA        Additional Documentation Vision Assessment/Intervention (Group)  -DANA           Vision Assessment/Intervention    Visual Impairment/Limitations corrective lenses for reading  -DANA           Positioning and  Restraints    Pre-Treatment Position sitting in chair/recliner  -DANA        Post Treatment Position chair  -DANA        In Chair reclined;call light within reach;encouraged to call for assist  -DANA           Pain Assessment    Additional Documentation Pain Scale: Numbers Pre/Post-Treatment (Group)  -DANA           Pain Scale: Numbers Pre/Post-Treatment    Pain Scale: Numbers, Pretreatment 6/10  -DANA        Pain Scale: Numbers, Post-Treatment 6/10  -DANA        Pain Location - Side Bilateral  -DANA        Pain Location - Orientation upper  -DANA        Pain Location chest  -DANA        Pre/Post Treatment Pain Comment per pt. with chest sorness.  -DANA        Pain Intervention(s) Repositioned  -DANA           Plan of Care Review    Plan of Care Reviewed With patient  -DANA           Clinical Impression (OT)    Date of Referral to OT 05/17/18  -DANA        OT Diagnosis Impaired ADL and mobility indep. due to limited activity tolerance and strength from resp. failure, COPD exacer., PNA.  -DANA        Patient/Family Goals Statement (OT Eval) regain independence  -DANA        Criteria for Skilled Therapeutic Interventions Met (OT Eval) yes;treatment indicated  -DANA        Rehab Potential (OT Eval) good, to achieve stated therapy goals  -DANA        Therapy Frequency (OT Eval) daily  -DANA        Care Plan Review (OT) evaluation/treatment results reviewed;care plan/treatment goals reviewed;risks/benefits reviewed;patient/other agree to care plan  -DANA        Anticipated Equipment Needs at Discharge (OT) --   TBD with progress, possible shower chair.  -DANA        Anticipated Discharge Disposition (OT) inpatient rehab facility;home with home health   pulmonary rehab  -DANA           Vital Signs    Pre Systolic BP Rehab 155  -DANA        Pre Treatment Diastolic    earlier BP  -DANA        Pretreatment Heart Rate (beats/min) 95  -DANA        Posttreatment Heart Rate (beats/min) 105  -DANA        Pre SpO2 (%) 96  -DANA        O2 Delivery Pre Treatment supplemental O2   -DANA        Post SpO2 (%) 92  -DANA        O2 Delivery Post Treatment supplemental O2  -DANA        Pre Patient Position Sitting  -DANA        Intra Patient Position Standing  -DANA        Post Patient Position Sitting  -DANA           Planned OT Interventions    Planned Therapy Interventions (OT Eval) activity tolerance training;strengthening exercise  -DANA           OT Goals    Strength Goal Selection (OT) strength, OT goal 1  -DANA        Activity Tolerance Goal Selection (OT) activity tolerance, OT goal 1  -DANA        Additional Documentation Activity Tolerance Goal Selection (OT) (Row);Strength Goal Selection (OT) (Row)  -DANA           Strength Goal 1 (OT)    Strength Goal 1 (OT) Pt. will complete 10-15 reps AROM/resistive ROM BUE's each session to support ADL, IADL and transfer indep.  -DANA        Time Frame (Strength Goal 1, OT) long term goal (LTG);1 week  -DANA        Progress/Outcome (Strength Goal 1, OT) goal ongoing  -DANA            Activity Tolerance Goal 1 (OT)    Activity Tolerance Goal 1 (OT) With ADL task, IADL task, ther. exer.  -DANA        Activity Level (Endurance Goal 1, OT) 15 min activity;O2 sat >/ equal to 88%   one rest period  -DANA        Time Frame (Activity Tolerance Goal 1, OT) long term goal (LTG);1 week  -DANA        Progress/Outcome (Activity Tolerance Goal 1, OT) goal ongoing  -DANA           Patient Education Goal (OT)    Activity (Patient Education Goal, OT) Pt. verbalize understand of EC/WS education and adaptive breathing with ability to verbalize 2 EC techniques she will use.  -DANA        Time Frame (Patient Education Goal, OT) long term goal (LTG);1 week  -DANA        Progress/Outcome (Patient Education Goal, OT) goal ongoing  -DANA           Living Environment    Home Accessibility tub/shower is not walk in  -DANA          User Key  (r) = Recorded By, (t) = Taken By, (c) = Cosigned By    Initials Name Effective Dates    DANA Winkler, OT 06/22/15 -            Occupational Therapy Education     Title: PT  OT SLP Therapies (Active)     Topic: Occupational Therapy (Active)     Point: ADL training (Done)     Description: Instruct learner(s) on proper safety adaptation and remediation techniques during self care or transfers.   Instruct in proper use of assistive devices.   Learning Progress Summary     Learner Status Readiness Method Response Comment Documented by    Patient Done Acceptance E VU role OT, reason for consult, goal setting.  05/18/18 1026                      User Key     Initials Effective Dates Name Provider Type Discipline     06/22/15 -  Kristy Winkler, OT Occupational Therapist OT                  OT Recommendation and Plan  Outcome Summary/Treatment Plan (OT)  Anticipated Equipment Needs at Discharge (OT):  (TBD with progress, possible shower chair.)  Anticipated Discharge Disposition (OT): inpatient rehab facility, home with home health (pulmonary rehab)  Planned Therapy Interventions (OT Eval): activity tolerance training, strengthening exercise  Therapy Frequency (OT Eval): daily  Plan of Care Review  Plan of Care Reviewed With: patient  Plan of Care Reviewed With: patient  Outcome Summary: OT evaluation completed.  Pt. demosntrating impaired strength and activity tolerance.  OT to educate on energy conservation and work toward improved activity tolerance/strength. Pt. may be appropriate for pulmonary rehab.          Outcome Measures     Row Name 05/18/18 0844             How much help from another is currently needed...    Putting on and taking off regular lower body clothing? 3  -DANA      Bathing (including washing, rinsing, and drying) 3  -DANA      Toileting (which includes using toilet bed pan or urinal) 3  -DANA      Putting on and taking off regular upper body clothing 3  -DANA      Taking care of personal grooming (such as brushing teeth) 3  -DANA      Eating meals 4  -DANA      Score 19  -DANA         Functional Assessment    Outcome Measure Options AM-PAC 6 Clicks Daily Activity (OT)  -DANA         User Key  (r) = Recorded By, (t) = Taken By, (c) = Cosigned By    Initials Name Provider Type    DANA Kristy Winkler, OT Occupational Therapist          Time Calculation:   OT Start Time: 0844    Therapy Charges for Today     Code Description Service Date Service Provider Modifiers Qty    [id number]  OT EVAL LOW COMPLEXITY 3 5/18/2018 Kristy Winkler OT GO 1               Kristy Winkler OT  5/18/2018

## 2018-05-18 NOTE — PROGRESS NOTES
Continued Stay Note   [facility]     Patient Name: Lalita Valentino  MRN: [id number]  Today's Date: 5/18/2018    Admit Date: 5/15/2018          Discharge Plan     Row Name 05/18/18 1124       Plan    Plan update    Patient/Family in Agreement with Plan yes    Plan Comments Spoke to Sushila with [facility] who is looking at referal and will contact  regarding bed.  Spoke with patient at bedside regarding discharge plan.  Patient in agreement with plan to [facility].  CM following.  Patient plan is to discharge to [facility] via car with family to transport when medically ready and bed available.      Final Discharge Disposition Code 62 - inpatient rehab facility              Discharge Codes    No documentation.       Expected Discharge Date and Time     Expected Discharge Date Expected Discharge Time    May 21, 2018             Sameera Burks RN

## 2018-05-18 NOTE — PROGRESS NOTES
[facility] Medicine Services  PROGRESS NOTE    Patient Name: Lalita Valentino  : 1965  MRN: [id number]    Date of Admission: 5/15/2018  Length of Stay: 3  Primary Care Physician: Skye Francisco MD    Subjective   Subjective     CC:  Acute Resp Failure f/u    HPI:  Pt still has cough, no wheezing, no SOA, on 1L NC, at home uses 2L.      Review of Systems  Gen- No fevers, chills  CV- No chest pain, palpitations  GI- No N/V/D, abd pain    Otherwise ROS is negative except as mentioned in the HPI.    Objective   Objective     Vital Signs:   Temp:  [97.5 °F (36.4 °C)-99.4 °F (37.4 °C)] 98.4 °F (36.9 °C)  Heart Rate:  [] 107  Resp:  [16-22] 18  BP: (123-157)/() 123/72        Physical Exam:  Constitutional: No acute distress, awake, alert  HENT: NCAT, mucous membranes moist  Respiratory:  Rhonchi and mild wheezing, respiratory effort normal   Cardiovascular: RRR, no murmurs, rubs, or gallops, palpable pedal pulses bilaterally  Gastrointestinal: Positive bowel sounds, soft, nontender, nondistended  Musculoskeletal: No bilateral ankle edema  Psychiatric: Appropriate affect, cooperative  Neurologic: Oriented x 3, strength symmetric in all extremities, Cranial Nerves grossly intact to confrontation, speech clear  Skin: No rashes      Results Reviewed:  I have personally reviewed current lab, radiology, and data and agree.      Results from last 7 days  Lab Units 18  0630 18  0413 18  0523 05/15/18  1921   WBC 10*3/mm3 12.69* 10.63 12.13* 16.26*   HEMOGLOBIN g/dL 11.9 10.8* 10.3* 12.0   HEMATOCRIT % 38.7 35.6 33.1* 39.9   PLATELETS 10*3/mm3 236 217 177 312   INR   --   --   --  0.97       Results from last 7 days  Lab Units 18  0630 18  0413 18  1243 18  0523 05/15/18  1921   SODIUM mmol/L 142 141  --  137 139   POTASSIUM mmol/L 4.0 4.5  --  4.3 4.7   CHLORIDE mmol/L 102 106  --  103 103   CO2 mmol/L 29.0 29.0  --  29.0 29.0   BUN mg/dL  24* 23  --  23 24*   CREATININE mg/dL 0.80 0.70  --  0.80 0.90   GLUCOSE mg/dL 96 122*  --  122* 131*   CALCIUM mg/dL 9.4 9.1  --  8.6* 8.6*   ALT (SGPT) U/L 25 20  --  21 31   AST (SGOT) U/L 21 17  --  18 27   TROPONIN I ng/mL  --  0.010 0.018  --  0.045*     Estimated Creatinine Clearance: 88.1 mL/min (by C-G formula based on SCr of 0.8 mg/dL).  BNP   Date Value Ref Range Status   05/15/2018 163.0 (H) 0.0 - 100.0 pg/mL Final     Comment:     Results may be falsely decreased if patient taking Biotin.     pH, Arterial   Date Value Ref Range Status   05/16/2018 7.429 7.350 - 7.450 pH units Final       Microbiology Results Abnormal     Procedure Component Value - Date/Time    Legionella Antigen, Urine - Urine, Urine, Clean Catch [[id number]] Collected:  05/15/18 2138    Lab Status:  Final result Specimen:  Urine from Urine, Clean Catch Updated:  05/18/18 1518     L. pneumophila Serogp 1 Ur Ag Negative     Comment: Presumptive negative for L. pneumophila serogroup 1 antigen in urine,  suggesting no recent or current infection. Legionnaires' disease  cannot be ruled out since other serogroups and species may also cause  disease.       Narrative:       Performed at:  [address] [place] [address]  [id number]  : Mika Jackson MD, Phone:  [phone]    S. Pneumo Ag Urine or CSF - Urine, Urine, Clean Catch [[id number]] Collected:  05/15/18 2138    Lab Status:  Final result Specimen:  Urine from Urine, Clean Catch Updated:  05/18/18 1518     Specimen Source Urine     STREP PNEUMONIAE ANTIGEN Negative     Body Fluid Culture, Sterile Not Indicated     Organism ID Not indicated.     Please note Comment     Comment: College [employer]ologists standards require a culture to be  performed on CSF specimens submitted for bacterial antigen testing.  (CAP [id number]) Urine specimens will not be cultured.       Narrative:       Performed at:  [address] [place] [address]  [place], [place]  [id number]  : Mika Jackson MD, Phone:  [phone]    Respiratory Culture - Sputum, ET Suction [[id number]] Collected:  05/16/18 0110    Lab Status:  Final result Specimen:  Sputum from ET Suction Updated:  05/18/18 0823     Respiratory Culture No growth at 2 days     Gram Stain Result Few (2+) WBCs per low power field      Rare (1+) Epithelial cells per low power field      Rare (1+) Gram positive cocci in pairs and chains      Rare (1+) Normal respiratory reji    Blood Culture - Blood, [[id number]]  (Normal) Collected:  05/16/18 0523    Lab Status:  Preliminary result Specimen:  Blood from Hand, Right Updated:  05/18/18 0700     Blood Culture No growth at 2 days    Blood Culture - Blood, [[id number]]  (Normal) Collected:  05/15/18 2028    Lab Status:  Preliminary result Specimen:  Blood from Hand, Right Updated:  05/17/18 2115     Blood Culture No growth at 2 days          Imaging Results (last 24 hours)     ** No results found for the last 24 hours. **        Results for orders placed during the hospital encounter of 05/15/18   Adult Transthoracic Echo Complete W/ Cont if Necessary Per Protocol    Narrative · Mild mitral valve regurgitation is present.  · Mild tricuspid valve regurgitation is present.  · Calculated right ventricular systolic pressure from tricuspid   regurgitation is 31 mmHg.  · The following left ventricular wall segments are hypokinetic: basal   inferolateral, mid inferior and basal inferior.  · Left ventricular systolic function is normal. Estimated EF = 56%.  · Left ventricular diastolic dysfunction (grade I) consistent with   impaired relaxation.  · There is no evidence of pericardial effusion.  · No evidence of pulmonary hypertension is present.  · The aortic valve exhibits sclerosis.  · Normal right ventricular cavity size, wall thickness, systolic function   and septal motion noted.          I have reviewed the medications.    Assessment/Plan   Assessment /  "Plan     Hospital Problem List     Acute on chronic respiratory failure with hypoxia and hypercapnia    Chronic obstructive pulmonary disease with acute exacerbation    Anxiety and depression    Hypothyroid    Pneumonia due to infectious organism             Brief Hospital Course to date:  Lalita Valentino is a 52 y.o. female with relevant PMHx of COPD, anxiety, depression, hypothyroidism, found \"tripoding\" at home by EMS, recurrent admissions for respiratory failure, intubated 4 times in past, who was intubated emergently in [facility] ER admitted to [facility] ICU on 5/15/2018 w/ AECOPD +/- pneumonia.   ECHO done 5/16/18 showed:  · Mild mitral valve regurgitation is present.  · Mild tricuspid valve regurgitation is present.  · Calculated right ventricular systolic pressure from tricuspid regurgitation is 31 mmHg.  · The following left ventricular wall segments are hypokinetic: basal inferolateral, mid inferior and basal inferior.  · Left ventricular systolic function is normal. Estimated EF = 56%.  · Left ventricular diastolic dysfunction (grade I) consistent with impaired relaxation.  · There is no evidence of pericardial effusion.  · No evidence of pulmonary hypertension is present.  · The aortic valve exhibits sclerosis.  · Normal right ventricular cavity size, wall thickness, systolic function and septal motion noted.      Assessment & Plan:  AECOPD - continue nebs, continue pred to 40 daily for aother 4 days.  Add chronic daily azithro and roflumilast for recurrent exacerbations.   legionella and strep Ag negative, Blood Cx NGTD, Sputum Cx NGTD.     Abdominal Distention - CT Abd/Pelvis neg, feels better.     Mildly Elevated Troponin - probably some demand ischemia from COPD, repeat trop neg.  F/u w/ cardiology as outpatient for WMA.  May need stress test.     Dysphagia - f/u GI as outpatient     Nutrition - Diet Regular     Ambulate     DVT Prophylaxis:  SCDs/Lovenox/PPI    CODE STATUS: Full Code    Disposition: " I expect the patient to be discharged to rehab in 1-2 days.      Electronically signed by Leon Sams MD, 05/18/18, 4:18 PM.

## 2018-05-18 NOTE — PLAN OF CARE
Problem: Patient Care Overview  Goal: Plan of Care Review  Outcome: Ongoing (interventions implemented as appropriate)   05/18/18 0522   Coping/Psychosocial   Plan of Care Reviewed With patient   Plan of Care Review   Progress no change   OTHER   Outcome Summary Pt slightly anxious at beginning of shift, feel asleep soon after 2100 med administration and slept through the night with no significant events noted. VSS, maintaining in sinus tachycardia with occasional PACs. Continues on 1L O2 humidified nasal cannula. Pt reports she wants to know the plan and is anxious to speak to the rounding MD.

## 2018-05-19 LAB — MAGNESIUM SERPL-MCNC: 2 MG/DL (ref 1.3–2.7)

## 2018-05-19 PROCEDURE — 63710000001 PREDNISONE PER 1 MG: Performed by: INTERNAL MEDICINE

## 2018-05-19 PROCEDURE — 94799 UNLISTED PULMONARY SVC/PX: CPT

## 2018-05-19 PROCEDURE — 99231 SBSQ HOSP IP/OBS SF/LOW 25: CPT | Performed by: FAMILY MEDICINE

## 2018-05-19 PROCEDURE — 94760 N-INVAS EAR/PLS OXIMETRY 1: CPT

## 2018-05-19 PROCEDURE — 25010000002 ENOXAPARIN PER 10 MG: Performed by: INTERNAL MEDICINE

## 2018-05-19 PROCEDURE — 83735 ASSAY OF MAGNESIUM: CPT

## 2018-05-19 RX ORDER — IBUPROFEN 600 MG/1
600 TABLET ORAL EVERY 4 HOURS PRN
Status: DISCONTINUED | OUTPATIENT
Start: 2018-05-19 | End: 2018-05-22 | Stop reason: HOSPADM

## 2018-05-19 RX ADMIN — ROFLUMILAST 500 MCG: 500 TABLET ORAL at 09:09

## 2018-05-19 RX ADMIN — GABAPENTIN 300 MG: 300 CAPSULE ORAL at 22:03

## 2018-05-19 RX ADMIN — IPRATROPIUM BROMIDE AND ALBUTEROL SULFATE 3 ML: 2.5; .5 SOLUTION RESPIRATORY (INHALATION) at 07:36

## 2018-05-19 RX ADMIN — IBUPROFEN 600 MG: 600 TABLET ORAL at 16:46

## 2018-05-19 RX ADMIN — PREDNISONE 40 MG: 20 TABLET ORAL at 09:08

## 2018-05-19 RX ADMIN — LEVOTHYROXINE SODIUM 112 MCG: 112 TABLET ORAL at 06:15

## 2018-05-19 RX ADMIN — IPRATROPIUM BROMIDE AND ALBUTEROL SULFATE 3 ML: 2.5; .5 SOLUTION RESPIRATORY (INHALATION) at 13:14

## 2018-05-19 RX ADMIN — MIRTAZAPINE 15 MG: 15 TABLET, FILM COATED ORAL at 22:03

## 2018-05-19 RX ADMIN — BUPROPION HYDROCHLORIDE 150 MG: 150 TABLET, EXTENDED RELEASE ORAL at 09:08

## 2018-05-19 RX ADMIN — ENOXAPARIN SODIUM 40 MG: 40 INJECTION SUBCUTANEOUS at 15:20

## 2018-05-19 RX ADMIN — IPRATROPIUM BROMIDE AND ALBUTEROL SULFATE 3 ML: 2.5; .5 SOLUTION RESPIRATORY (INHALATION) at 19:05

## 2018-05-19 RX ADMIN — PANTOPRAZOLE SODIUM 40 MG: 40 TABLET, DELAYED RELEASE ORAL at 06:15

## 2018-05-19 RX ADMIN — IPRATROPIUM BROMIDE AND ALBUTEROL SULFATE 3 ML: 2.5; .5 SOLUTION RESPIRATORY (INHALATION) at 01:19

## 2018-05-19 RX ADMIN — GABAPENTIN 300 MG: 300 CAPSULE ORAL at 09:08

## 2018-05-19 RX ADMIN — BUPROPION HYDROCHLORIDE 150 MG: 150 TABLET, EXTENDED RELEASE ORAL at 22:03

## 2018-05-19 RX ADMIN — AZITHROMYCIN 250 MG: 250 TABLET, FILM COATED ORAL at 09:08

## 2018-05-19 NOTE — PROGRESS NOTES
[facility] Medicine Services  PROGRESS NOTE    Patient Name: Lalita Valentino  : 1965  MRN: [id number]    Date of Admission: 5/15/2018  Length of Stay: 4  Primary Care Physician: Skye Francisco MD    Subjective   Subjective     CC:  Acute Resp Failure f/u    HPI:  Pt still has cough, no wheezing, no SOA, on 1L NC, at home uses 2L.      Review of Systems  Gen- No fevers, chills  CV- No chest pain, palpitations  GI- No N/V/D, abd pain     Otherwise ROS is negative except as mentioned in the HPI.    Objective   Objective     Vital Signs:   Temp:  [97 °F (36.1 °C)-98.2 °F (36.8 °C)] 97.9 °F (36.6 °C)  Heart Rate:  [] 94  Resp:  [15-18] 18  BP: (111-138)/(72-92) 111/77        Physical Exam:  Constitutional: No acute distress, awake, alert  HENT: NCAT, mucous membranes moist  Respiratory:  Rhonchi, no wheezing, respiratory effort normal   Cardiovascular: RRR, no murmurs, rubs, or gallops, palpable pedal pulses bilaterally  Gastrointestinal: Positive bowel sounds, soft, nontender, nondistended  Musculoskeletal: No bilateral ankle edema  Psychiatric: Appropriate affect, cooperative  Neurologic: Oriented x 3, strength symmetric in all extremities, Cranial Nerves grossly intact to confrontation, speech clear  Skin: No rashes    Results Reviewed:  I have personally reviewed current lab, radiology, and data and agree.      Results from last 7 days  Lab Units 18  0630 18  0413 18  0523 05/15/18  1921   WBC 10*3/mm3 12.69* 10.63 12.13* 16.26*   HEMOGLOBIN g/dL 11.9 10.8* 10.3* 12.0   HEMATOCRIT % 38.7 35.6 33.1* 39.9   PLATELETS 10*3/mm3 236 217 177 312   INR   --   --   --  0.97       Results from last 7 days  Lab Units 18  0630 18  0413 18  1243 18  0523 05/15/18  1921   SODIUM mmol/L 142 141  --  137 139   POTASSIUM mmol/L 4.0 4.5  --  4.3 4.7   CHLORIDE mmol/L 102 106  --  103 103   CO2 mmol/L 29.0 29.0  --  29.0 29.0   BUN mg/dL 24* 23  --   23 24*   CREATININE mg/dL 0.80 0.70  --  0.80 0.90   GLUCOSE mg/dL 96 122*  --  122* 131*   CALCIUM mg/dL 9.4 9.1  --  8.6* 8.6*   ALT (SGPT) U/L 25 20  --  21 31   AST (SGOT) U/L 21 17  --  18 27   TROPONIN I ng/mL  --  0.010 0.018  --  0.045*     Estimated Creatinine Clearance: 88.1 mL/min (by C-G formula based on SCr of 0.8 mg/dL).  No results found for: BNP  No results found for: PHART    Microbiology Results Abnormal     Procedure Component Value - Date/Time    Blood Culture - Blood, [[id number]]  (Normal) Collected:  05/16/18 0523    Lab Status:  Preliminary result Specimen:  Blood from Hand, Right Updated:  05/19/18 0700     Blood Culture No growth at 3 days    Blood Culture - Blood, [[id number]]  (Normal) Collected:  05/15/18 2028    Lab Status:  Preliminary result Specimen:  Blood from Hand, Right Updated:  05/18/18 2115     Blood Culture No growth at 3 days    Legionella Antigen, Urine - Urine, Urine, Clean Catch [[id number]] Collected:  05/15/18 2138    Lab Status:  Final result Specimen:  Urine from Urine, Clean Catch Updated:  05/18/18 1518     L. pneumophila Serogp 1 Ur Ag Negative     Comment: Presumptive negative for L. pneumophila serogroup 1 antigen in urine,  suggesting no recent or current infection. Legionnaires' disease  cannot be ruled out since other serogroups and species may also cause  disease.       Narrative:       Performed at:  [address] [place] [address]  [id number]  : Mika Jackson MD, Phone:  [phone]    S. Pneumo Ag Urine or CSF - Urine, Urine, Clean Catch [[id number]] Collected:  05/15/18 2138    Lab Status:  Final result Specimen:  Urine from Urine, Clean Catch Updated:  05/18/18 1518     Specimen Source Urine     STREP PNEUMONIAE ANTIGEN Negative     Body Fluid Culture, Sterile Not Indicated     Organism ID Not indicated.     Please note Comment     Comment: College [employer] standards require a culture to be  performed  on CSF specimens submitted for bacterial antigen testing.  (CAP [id number]) Urine specimens will not be cultured.       Narrative:       Performed at:  [address], [place], [place]  [id number]  : Mika Jackson MD, Phone:  [phone]    Respiratory Culture - Sputum, ET Suction [[id number]] Collected:  05/16/18 0110    Lab Status:  Final result Specimen:  Sputum from ET Suction Updated:  05/18/18 0823     Respiratory Culture No growth at 2 days     Gram Stain Result Few (2+) WBCs per low power field      Rare (1+) Epithelial cells per low power field      Rare (1+) Gram positive cocci in pairs and chains      Rare (1+) Normal respiratory reji          Imaging Results (last 24 hours)     ** No results found for the last 24 hours. **        Results for orders placed during the hospital encounter of 05/15/18   Adult Transthoracic Echo Complete W/ Cont if Necessary Per Protocol    Narrative · Mild mitral valve regurgitation is present.  · Mild tricuspid valve regurgitation is present.  · Calculated right ventricular systolic pressure from tricuspid   regurgitation is 31 mmHg.  · The following left ventricular wall segments are hypokinetic: basal   inferolateral, mid inferior and basal inferior.  · Left ventricular systolic function is normal. Estimated EF = 56%.  · Left ventricular diastolic dysfunction (grade I) consistent with   impaired relaxation.  · There is no evidence of pericardial effusion.  · No evidence of pulmonary hypertension is present.  · The aortic valve exhibits sclerosis.  · Normal right ventricular cavity size, wall thickness, systolic function   and septal motion noted.          I have reviewed the medications.    Assessment/Plan   Assessment / Plan     Hospital Problem List     Acute on chronic respiratory failure with hypoxia and hypercapnia    Chronic obstructive pulmonary disease with acute exacerbation    Anxiety and depression    Hypothyroid    Pneumonia  "due to infectious organism             Brief Hospital Course to date:  Lalita Valentino is a 52 y.o. female with relevant PMHx of COPD, anxiety, depression, hypothyroidism, found \"tripoding\" at home by EMS, recurrent admissions for respiratory failure, intubated 4 times in past, who was intubated emergently in [facility] ER admitted to [facility] ICU on 5/15/2018 w/ AECOPD +/- pneumonia.   ECHO done 5/16/18 showed:  · Mild mitral valve regurgitation is present.  · Mild tricuspid valve regurgitation is present.  · Calculated right ventricular systolic pressure from tricuspid regurgitation is 31 mmHg.  · The following left ventricular wall segments are hypokinetic: basal inferolateral, mid inferior and basal inferior.  · Left ventricular systolic function is normal. Estimated EF = 56%.  · Left ventricular diastolic dysfunction (grade I) consistent with impaired relaxation.  · There is no evidence of pericardial effusion.  · No evidence of pulmonary hypertension is present.  · The aortic valve exhibits sclerosis.  · Normal right ventricular cavity size, wall thickness, systolic function and septal motion noted.        Assessment & Plan:  AECOPD - continue nebs, continue pred to 40 daily for aother 3 days.  Add chronic daily azithro and roflumilast for recurrent exacerbations.   legionella and strep Ag negative, Blood Cx NGTD, Sputum Cx NGTD.     Abdominal Distention - CT Abd/Pelvis neg, feels better.     Mildly Elevated Troponin - probably some demand ischemia from COPD, repeat trop neg.  F/u w/ cardiology as outpatient for WMA.  May need stress test.     Dysphagia - f/u GI as outpatient     Nutrition - Diet Regular     Ambulate     DVT Prophylaxis:  SCDs/Lovenox/PPI     CODE STATUS: Full Code     Disposition: I expect the patient to be discharged to rehab in 1-2 days, waiting on Bed..      Electronically signed by Leon Sams MD, 05/19/18, 4:56 PM.      "

## 2018-05-19 NOTE — PLAN OF CARE
Problem: Patient Care Overview  Goal: Plan of Care Review  Outcome: Ongoing (interventions implemented as appropriate)   05/19/18 5619   Coping/Psychosocial   Plan of Care Reviewed With patient   OTHER   Outcome Summary pt rested comfortably throughout the night; took all meds and vitals remained stable

## 2018-05-19 NOTE — PLAN OF CARE
Problem: Patient Care Overview  Goal: Plan of Care Review  Outcome: Ongoing (interventions implemented as appropriate)    Goal: Individualization and Mutuality  Outcome: Ongoing (interventions implemented as appropriate)    Goal: Discharge Needs Assessment  Outcome: Ongoing (interventions implemented as appropriate)    Goal: Interprofessional Rounds/Family Conf  Outcome: Ongoing (interventions implemented as appropriate)      Problem: Skin Injury Risk (Adult)  Goal: Skin Health and Integrity  Outcome: Ongoing (interventions implemented as appropriate)

## 2018-05-20 LAB — BACTERIA SPEC AEROBE CULT: NORMAL

## 2018-05-20 PROCEDURE — 63710000001 PREDNISONE PER 1 MG: Performed by: INTERNAL MEDICINE

## 2018-05-20 PROCEDURE — 25010000002 ENOXAPARIN PER 10 MG: Performed by: INTERNAL MEDICINE

## 2018-05-20 PROCEDURE — 97530 THERAPEUTIC ACTIVITIES: CPT

## 2018-05-20 PROCEDURE — 94799 UNLISTED PULMONARY SVC/PX: CPT

## 2018-05-20 PROCEDURE — 94760 N-INVAS EAR/PLS OXIMETRY 1: CPT

## 2018-05-20 PROCEDURE — 94640 AIRWAY INHALATION TREATMENT: CPT

## 2018-05-20 PROCEDURE — 99231 SBSQ HOSP IP/OBS SF/LOW 25: CPT | Performed by: FAMILY MEDICINE

## 2018-05-20 RX ADMIN — LEVOTHYROXINE SODIUM 112 MCG: 112 TABLET ORAL at 05:07

## 2018-05-20 RX ADMIN — GABAPENTIN 300 MG: 300 CAPSULE ORAL at 08:39

## 2018-05-20 RX ADMIN — IPRATROPIUM BROMIDE AND ALBUTEROL SULFATE 3 ML: 2.5; .5 SOLUTION RESPIRATORY (INHALATION) at 00:19

## 2018-05-20 RX ADMIN — ENOXAPARIN SODIUM 40 MG: 40 INJECTION SUBCUTANEOUS at 14:45

## 2018-05-20 RX ADMIN — GABAPENTIN 300 MG: 300 CAPSULE ORAL at 21:37

## 2018-05-20 RX ADMIN — IPRATROPIUM BROMIDE AND ALBUTEROL SULFATE 3 ML: 2.5; .5 SOLUTION RESPIRATORY (INHALATION) at 19:01

## 2018-05-20 RX ADMIN — IPRATROPIUM BROMIDE AND ALBUTEROL SULFATE 3 ML: 2.5; .5 SOLUTION RESPIRATORY (INHALATION) at 13:33

## 2018-05-20 RX ADMIN — BUPROPION HYDROCHLORIDE 150 MG: 150 TABLET, EXTENDED RELEASE ORAL at 08:38

## 2018-05-20 RX ADMIN — PREDNISONE 40 MG: 20 TABLET ORAL at 08:38

## 2018-05-20 RX ADMIN — PANTOPRAZOLE SODIUM 40 MG: 40 TABLET, DELAYED RELEASE ORAL at 05:07

## 2018-05-20 RX ADMIN — IBUPROFEN 600 MG: 600 TABLET ORAL at 14:45

## 2018-05-20 RX ADMIN — BUPROPION HYDROCHLORIDE 150 MG: 150 TABLET, EXTENDED RELEASE ORAL at 21:37

## 2018-05-20 RX ADMIN — IPRATROPIUM BROMIDE AND ALBUTEROL SULFATE 3 ML: 2.5; .5 SOLUTION RESPIRATORY (INHALATION) at 07:37

## 2018-05-20 RX ADMIN — AZITHROMYCIN 250 MG: 250 TABLET, FILM COATED ORAL at 08:39

## 2018-05-20 RX ADMIN — MIRTAZAPINE 15 MG: 15 TABLET, FILM COATED ORAL at 21:37

## 2018-05-20 RX ADMIN — ROFLUMILAST 500 MCG: 500 TABLET ORAL at 08:39

## 2018-05-20 NOTE — PLAN OF CARE
Problem: Patient Care Overview  Goal: Plan of Care Review  Outcome: Ongoing (interventions implemented as appropriate)   05/20/18 9465   Coping/Psychosocial   Plan of Care Reviewed With patient   Plan of Care Review   Progress improving   OTHER   Outcome Summary Pt. issued information on energy conservation/works simplification and reviewed. Pt. able to ID things she can do differently. UE HEP issued pt. needing many rest periods with completion. Pt. very difficulty time using adaptive breating.

## 2018-05-20 NOTE — PROGRESS NOTES
Continued Stay Note  [facility]     Patient Name: Lalita Valentino  MRN: [id number]  Today's Date: 5/20/2018    Admit Date: 5/15/2018          Discharge Plan     Row Name 05/20/18 0927       Plan    Plan Comments Spoke with Kermit at [facility].  No bed available this weekend.  CM will f/u on Monday.                Discharge Codes    No documentation.       Expected Discharge Date and Time     Expected Discharge Date Expected Discharge Time    May 21, 2018             Karine Burr

## 2018-05-20 NOTE — PLAN OF CARE
Problem: Patient Care Overview  Goal: Plan of Care Review  Outcome: Ongoing (interventions implemented as appropriate)   05/20/18 0332   Coping/Psychosocial   Plan of Care Reviewed With patient   Plan of Care Review   Progress no change   OTHER   Outcome Summary Pt rested comfortably throughout the night and did not request any medications for pain. She took all medications and her vital signs remained stable at 05/19/18 0149

## 2018-05-20 NOTE — THERAPY TREATMENT NOTE
Acute Care - Occupational Therapy Treatment Note  [facility]     Patient Name: Lalita Valentino  : 1965  MRN: [id number]  Today's Date: 2018  Onset of Illness/Injury or Date of Surgery: 05/15/18  Date of Referral to OT: 18  Referring Physician: MD Peter    Admit Date: 5/15/2018       ICD-10-CM ICD-9-CM   1. Impaired functional mobility, balance, gait, and endurance Z74.09 V49.89   2. Impaired mobility and ADLs Z74.09 799.89     Patient Active Problem List   Diagnosis   • Acute on chronic respiratory failure with hypoxia and hypercapnia   • Chronic obstructive pulmonary disease with acute exacerbation   • Anxiety and depression   • Hypothyroid   • Pneumonia due to infectious organism     Past Medical History:   Diagnosis Date   • Anxiety    • Depression 5/15/2018   • H/O  section    • Hypothyroid 5/15/2018     History reviewed. No pertinent surgical history.    Therapy Treatment          Rehabilitation Treatment Summary     Row Name 18 1526             Treatment Time/Intention    Discipline occupational therapist  -DANA      Document Type therapy note (daily note)  -DANA      Subjective Information complains of;dyspnea;pain  -DANA      Mode of Treatment individual therapy;occupational therapy  -DANA      Patient/Family Observations No family present.  -DANA      Patient Effort good  -DANA      Comment SOA  -DANA      Existing Precautions/Restrictions oxygen therapy device and L/min  -DANA      Treatment Considerations/Comments Pt. up moving around on own in room now.  -DANA      Recorded by [DANA] Kristy Winkler OT 18 0956      Row Name 18 1526             Vital Signs    Post Systolic BP Rehab 123  -DANA      Post Treatment Diastolic BP 74  -DANA      Pretreatment Heart Rate (beats/min) 94  -DANA      Posttreatment Heart Rate (beats/min) 109  -DANA      Pre SpO2 (%) 98  -DANA      O2 Delivery Pre Treatment supplemental O2  -DANA      Post SpO2 (%) 92  -DANA      O2 Delivery Post Treatment supplemental  O2  -DANA      Pre Patient Position Supine  -DANA      Intra Patient Position Sitting  -DANA      Post Patient Position Sitting  -DANA      Recorded by [DANA] Kristy Winkler OT 05/20/18 1556      Row Name 05/20/18 1526             Cognitive Assessment/Intervention- PT/OT    Affect/Mental Status (Cognitive) WFL  -DANA      Orientation Status (Cognition) oriented to;person  -DANA      Follows Commands (Cognition) WFL  -DANA      Personal Safety Interventions fall prevention program maintained;gait belt;nonskid shoes/slippers when out of bed   when up  -DANA      Recorded by [DANA] Kristy Winkler OT 05/20/18 1556      Row Name 05/20/18 1526             Bed Mobility Assessment/Treatment    Bed Mobility Assessment/Treatment supine-sit;scooting/bridging  -DANA      Scooting/Bridging [place] (Bed Mobility) independent  -DANA      Supine-Sit [place] (Bed Mobility) independent  -DANA      Comment (Bed Mobility) no difficulty.  -DANA      Recorded by [DANA] Kristy Winkler OT 05/20/18 1556      Row Name 05/20/18 1526             BADL Safety/Performance    Skilled BADL Treatment/Intervention energy conservation   handout issued to pt. and reviewed EC/WS/PLB  -DANA      Recorded by [DANA] Kristy Winkler OT 05/20/18 1556      Row Name 05/20/18 1526             Motor Skills Assessment/Interventions    Additional Documentation Therapeutic Exercise (Group);Therapeutic Exercise Interventions (Group)  -DANA      Recorded by [DANA] Kristy Winkler OT 05/20/18 1559      Row Name 05/20/18 1526             Therapeutic Exercise    Upper Extremity Range of Motion (Therapeutic Exercise) shoulder flexion/extension, bilateral;shoulder abduction/adduction, bilateral;shoulder horizontal abduction/adduction, bilateral;elbow flexion/extension, bilateral   scapula elevation and retraction  -DANA      Exercise Type (Therapeutic Exercise) AROM (active range of motion);resistive exercises   resistive biceps and triceps only  -DANA      Position (Therapeutic Exercise) seated  -DANA       Sets/Reps (Therapeutic Exercise) 1/10  -DANA      Comment (Therapeutic Exercise) multiple rest periods, worked on AB  -DANA      Recorded by [DANA] Kristy Winkler, MODESTA 05/20/18 1559      Row Name 05/20/18 1526             Dynamic Sitting Balance    Level of [place], Reaches Outside Midline (Sitting, Dynamic Balance) independent  -DANA      Sitting Position, Reaches Outside Midline (Sitting, Dynamic Balance) sitting on edge of bed   ther exer  -DANA      Recorded by [DANA] Kristy Winkler, MODESTA 05/20/18 1556      Row Name 05/20/18 1526             Positioning and Restraints    Pre-Treatment Position in bed  -DANA      Post Treatment Position bed  -DANA      In Bed sitting;call light within reach  -DANA      Recorded by [DANA] Kristy Winkler, MODESTA 05/20/18 1556      Row Name 05/20/18 1526             Pain Scale: Numbers Pre/Post-Treatment    Pain Scale: Numbers, Pretreatment 8/10  -DANA      Pain Scale: Numbers, Post-Treatment 0/10 - no pain  -DANA      Pain Location - Side Right  -DANA      Pain Location knee  -DANA      Pain Intervention(s) Medication (See MAR)   per pt. just given pain pill prior to OT arrival  -DANA      Recorded by [DANA] Kristy Winkler, MODESTA 05/20/18 1556      Row Name 05/20/18 1526             Vision Assessment/Intervention    Visual Impairment/Limitations --   Pt. could not read EC or UE HEP handout due glasses not here  -DANA      Recorded by [DANA] Kristy Winkler, MODESTA 05/20/18 1556      Row Name 05/20/18 1526             Plan of Care Review    Plan of Care Reviewed With patient  -DANA      Recorded by [DANA] Kristy Winkler OT 05/20/18 1556      Row Name 05/20/18 1526             Outcome Summary/Treatment Plan (OT)    Daily Summary of Progress (OT) progress toward functional goals is good  -DANA      Plan for Continued Treatment (OT) cont working on adaptive breathing.  -DANA      Recorded by [DANA] Kristy Winkler, MODESTA 05/20/18 1556        User Key  (r) = Recorded By, (t) = Taken By, (c) = Cosigned By    Initials Name Effective Dates  Discipline    DANA Kristy Suzanna Winkler, OT 06/22/15 -  OT                   OT Rehab Goals     Row Name 05/20/18 1526             Strength Goal 1 (OT)    Progress/Outcome (Strength Goal 1, OT) goal ongoing   Pt. did 10 reps, but multiple rest often stopping at 5.  -DANA         Patient Education Goal (OT)    Progress/Outcome (Patient Education Goal, OT) goal partially met   MET with EC/WS and ID of 2, not adaptive breathing  -DANA        User Key  (r) = Recorded By, (t) = Taken By, (c) = Cosigned By    Initials Name Provider Type Discipline    DANA Kristy Briseno Peterson, OT Occupational Therapist OT        Occupational Therapy Education     Title: PT OT SLP Therapies (Active)     Topic: Occupational Therapy (Active)     Point: ADL training (Done)     Description: Instruct learner(s) on proper safety adaptation and remediation techniques during self care or transfers.   Instruct in proper use of assistive devices.   Learning Progress Summary     Learner Status Readiness Method Response Comment Documented by    Patient Done Acceptance E VU role OT, reason for consult, goal setting.  05/18/18 1026          Point: Home exercise program (Done)     Description: Instruct learner(s) on appropriate technique for monitoring, assisting and/or progressing therapeutic exercises/activities.   Learning Progress Summary     Learner Status Readiness Method Response Comment Documented by    Patient Done Acceptance E,D,H VU,NR UE HEP issued, PLB practiced, but pt. with much difficulty following, EC/WS handout issued and review.  Pt. able verbalize some things can change.  05/20/18 1556          Point: Body mechanics (Done)     Description: Instruct learner(s) on proper positioning and spine alignment during self-care, functional mobility activities and/or exercises.   Learning Progress Summary     Learner Status Readiness Method Response Comment Documented by    Patient Done Acceptance E,D,H VU,NR UE HEP issued, PLB practiced, but pt. with much  difficulty following, EC/WS handout issued and review.  Pt. able verbalize some things can change.  05/20/18 1556                      User Key     Initials Effective Dates Name Provider Type Discipline     06/22/15 -  Kristy Winkler OT Occupational Therapist OT                OT Recommendation and Plan  Outcome Summary/Treatment Plan (OT)  Daily Summary of Progress (OT): progress toward functional goals is good  Plan for Continued Treatment (OT): cont working on adaptive breathing.  Anticipated Equipment Needs at Discharge (OT):  (TBD with progress, possible shower chair.)  Anticipated Discharge Disposition (OT): inpatient rehab facility, home with home health (pulmonary rehab)  Planned Therapy Interventions (OT Eval): activity tolerance training, strengthening exercise  Therapy Frequency (OT Eval): daily  Daily Summary of Progress (OT): progress toward functional goals is good  Plan of Care Review  Plan of Care Reviewed With: patient  Plan of Care Reviewed With: patient  Outcome Summary: Pt. issued information on energy conservation/works simplification and reviewed.  Pt. able to ID things she can do differently.  UE HEP issued pt. needing many rest periods with completion.  Pt. very difficulty time using adaptive breating.        Outcome Measures     Row Name 05/20/18 1526 05/18/18 0844 05/18/18 0814       How much help from another person do you currently need...    Turning from your back to your side while in flat bed without using bedrails?  --  -- 3  -KR    Moving from lying on back to sitting on the side of a flat bed without bedrails?  --  -- 3  -KR    Moving to and from a bed to a chair (including a wheelchair)?  --  -- 3  -KR    Standing up from a chair using your arms (e.g., wheelchair, bedside chair)?  --  -- 3  -KR    Climbing 3-5 steps with a railing?  --  -- 2  -KR    To walk in hospital room?  --  -- 3  -KR    AM-PAC 6 Clicks Score  --  -- 17  -KR       How much help from another is currently  needed...    Putting on and taking off regular lower body clothing? 4   as long as pt. has multiple rest periods available.  -DANA 3  -DANA  --    Bathing (including washing, rinsing, and drying) 4  -DANA 3  -DANA  --    Toileting (which includes using toilet bed pan or urinal) 4  -DANA 3  -DANA  --    Putting on and taking off regular upper body clothing 4  -DANA 3  -DANA  --    Taking care of personal grooming (such as brushing teeth) 4  -DANA 3  -DANA  --    Eating meals 4  -DANA 4  -DANA  --    Score 24  -DANA 19  -DANA  --       Functional Assessment    Outcome Measure Options AM-PAC 6 Clicks Daily Activity (OT)  -DANA AM-PAC 6 Clicks Daily Activity (OT)  -DANA AM-PAC 6 Clicks Basic Mobility (PT)  -KR      User Key  (r) = Recorded By, (t) = Taken By, (c) = Cosigned By    Initials Name Provider Type    DANA Winkler OT Occupational Therapist    YOMI Stephenson, PT Physical Therapist           Time Calculation:         Time Calculation- OT     Row Name 05/20/18 1602             Time Calculation- OT    OT Start Time 1526  -DANA      Total Timed Code Minutes- OT 24 minute(s)  -DANA      OT Received On 05/20/18  -DANA      OT Goal Re-Cert Due Date 05/28/18  -DANA        User Key  (r) = Recorded By, (t) = Taken By, (c) = Cosigned By    Initials Name Provider Type    DANA Winkler OT Occupational Therapist           Therapy Charges for Today     Code Description Service Date Service Provider Modifiers Qty    [id number]  OT THERAPEUTIC ACT EA 15 MIN 5/20/2018 Kristy Winkler OT GO 2               Kristy Winkler OT  5/20/2018

## 2018-05-21 LAB — BACTERIA SPEC AEROBE CULT: NORMAL

## 2018-05-21 PROCEDURE — 94799 UNLISTED PULMONARY SVC/PX: CPT

## 2018-05-21 PROCEDURE — 97530 THERAPEUTIC ACTIVITIES: CPT

## 2018-05-21 PROCEDURE — 97110 THERAPEUTIC EXERCISES: CPT

## 2018-05-21 PROCEDURE — 94640 AIRWAY INHALATION TREATMENT: CPT

## 2018-05-21 PROCEDURE — 97116 GAIT TRAINING THERAPY: CPT

## 2018-05-21 PROCEDURE — 94760 N-INVAS EAR/PLS OXIMETRY 1: CPT

## 2018-05-21 PROCEDURE — 63710000001 PREDNISONE PER 1 MG: Performed by: INTERNAL MEDICINE

## 2018-05-21 PROCEDURE — 99232 SBSQ HOSP IP/OBS MODERATE 35: CPT | Performed by: NURSE PRACTITIONER

## 2018-05-21 RX ADMIN — IPRATROPIUM BROMIDE AND ALBUTEROL SULFATE 3 ML: 2.5; .5 SOLUTION RESPIRATORY (INHALATION) at 19:28

## 2018-05-21 RX ADMIN — BUPROPION HYDROCHLORIDE 150 MG: 150 TABLET, EXTENDED RELEASE ORAL at 09:20

## 2018-05-21 RX ADMIN — IPRATROPIUM BROMIDE AND ALBUTEROL SULFATE 3 ML: 2.5; .5 SOLUTION RESPIRATORY (INHALATION) at 12:58

## 2018-05-21 RX ADMIN — PANTOPRAZOLE SODIUM 40 MG: 40 TABLET, DELAYED RELEASE ORAL at 06:18

## 2018-05-21 RX ADMIN — IBUPROFEN 600 MG: 600 TABLET ORAL at 09:20

## 2018-05-21 RX ADMIN — IPRATROPIUM BROMIDE AND ALBUTEROL SULFATE 3 ML: 2.5; .5 SOLUTION RESPIRATORY (INHALATION) at 00:06

## 2018-05-21 RX ADMIN — IPRATROPIUM BROMIDE AND ALBUTEROL SULFATE 3 ML: 2.5; .5 SOLUTION RESPIRATORY (INHALATION) at 07:36

## 2018-05-21 RX ADMIN — AZITHROMYCIN 250 MG: 250 TABLET, FILM COATED ORAL at 09:20

## 2018-05-21 RX ADMIN — BUPROPION HYDROCHLORIDE 150 MG: 150 TABLET, EXTENDED RELEASE ORAL at 20:19

## 2018-05-21 RX ADMIN — GABAPENTIN 300 MG: 300 CAPSULE ORAL at 09:20

## 2018-05-21 RX ADMIN — LEVOTHYROXINE SODIUM 112 MCG: 112 TABLET ORAL at 06:18

## 2018-05-21 RX ADMIN — PREDNISONE 40 MG: 20 TABLET ORAL at 09:20

## 2018-05-21 RX ADMIN — ROFLUMILAST 500 MCG: 500 TABLET ORAL at 09:20

## 2018-05-21 RX ADMIN — MIRTAZAPINE 15 MG: 15 TABLET, FILM COATED ORAL at 20:19

## 2018-05-21 RX ADMIN — GABAPENTIN 300 MG: 300 CAPSULE ORAL at 20:19

## 2018-05-21 NOTE — PLAN OF CARE
Problem: Patient Care Overview  Goal: Plan of Care Review  Outcome: Ongoing (interventions implemented as appropriate)   05/21/18 1057   Coping/Psychosocial   Plan of Care Reviewed With patient   Plan of Care Review   Progress improving   OTHER   Outcome Summary Pt. demo improved safety and independence w/ mobility; however, pt. continues to readily fatiuge. Pt. performed transfers and gait w/ SBA and progressed forward ambulation to 100ft x 2, on 2L. Pt. desaturated to 81% post ambulation and required 1 standing rest break. Pt. participated in standing and seated BLE TherEx, requiring frequent rest breaks d/t SOA. Recommend cont IPPT per POC.

## 2018-05-21 NOTE — THERAPY TREATMENT NOTE
Acute Care - Occupational Therapy Treatment Note  [facility]     Patient Name: Lalita Valentino  : 1965  MRN: [id number]  Today's Date: 2018  Onset of Illness/Injury or Date of Surgery: 05/15/18  Date of Referral to OT: 18  Referring Physician: MD Peter    Admit Date: 5/15/2018       ICD-10-CM ICD-9-CM   1. Impaired functional mobility, balance, gait, and endurance Z74.09 V49.89   2. Impaired mobility and ADLs Z74.09 799.89     Patient Active Problem List   Diagnosis   • Acute on chronic respiratory failure with hypoxia and hypercapnia   • Chronic obstructive pulmonary disease with acute exacerbation   • Anxiety and depression   • Hypothyroid   • Pneumonia due to infectious organism     Past Medical History:   Diagnosis Date   • Anxiety    • Depression 5/15/2018   • H/O  section    • Hypothyroid 5/15/2018     History reviewed. No pertinent surgical history.    Therapy Treatment          Rehabilitation Treatment Summary     Row Name 18 1419 18 0930          Treatment Time/Intention    Discipline occupational therapist  -CL physical therapist  -MB     Document Type therapy note (daily note)  -CL therapy note (daily note)  -MB     Subjective Information no complaints  -CL complains of;pain;dyspnea  -MB     Mode of Treatment  -- physical therapy;individual therapy  -MB     Patient/Family Observations  -- Pt. supine, on 2L/min O2 per NC, IV heplocked.  RN consent for PT.  No family present at bedside.  -MB     Care Plan Review  -- care plan/treatment goals reviewed;patient/other agree to care plan  -MB     Therapy Frequency (PT Clinical Impression)  -- daily  -MB     Patient Effort good  -CL good  -MB     Comment  -- SOA  -MB     Existing Precautions/Restrictions fall;oxygen therapy device and L/min  -CL fall;oxygen therapy device and L/min  -MB     Recorded by [CL] Nava Schaeffer, OT 18 1522 [MB] Rosi Bautista, PT 18 1050     Row Name 18 1419 18 2430           Vital Signs    Pre Systolic BP Rehab --   VSS, RN cleared for tx.   -  -MB     Pre Treatment Diastolic BP  -- 116  -MB     Post Systolic BP Rehab  -- 124  -MB     Post Treatment Diastolic BP  -- 87  -MB     Pretreatment Heart Rate (beats/min)  -- 95  -MB     Posttreatment Heart Rate (beats/min)  -- 107  -MB     Pre SpO2 (%)  -- 98  -MB     O2 Delivery Pre Treatment  -- supplemental O2  -MB     Intra SpO2 (%)  -- 81  -MB     O2 Delivery Intra Treatment  -- supplemental O2  -MB     Post SpO2 (%)  -- 96  -MB     O2 Delivery Post Treatment  -- supplemental O2  -MB     Pre Patient Position  -- Supine  -MB     Intra Patient Position  -- Standing  -MB     Post Patient Position  -- Standing  -MB     Recorded by [CL] Nava Schaeffer, OT 05/21/18 1522 [MB] Rosi Bautista, PT 05/21/18 1050     Row Name 05/21/18 1419 05/21/18 0930          Cognitive Assessment/Intervention- PT/OT    Affect/Mental Status (Cognitive) WFL  -CL WFL  -MB     Orientation Status (Cognition) oriented x 4  -CL oriented x 4  -MB     Follows Commands (Cognition) WFL  -CL WFL  -MB     Cognitive Function (Cognitive) safety deficit  -CL  --     Safety Deficit (Cognitive) mild deficit;awareness of need for assistance;safety precautions awareness  -CL mild deficit;ability to follow commands;safety precautions awareness  -MB     Personal Safety Interventions fall prevention program maintained;gait belt;nonskid shoes/slippers when out of bed  -CL fall prevention program maintained;gait belt;muscle strengthening facilitated;nonskid shoes/slippers when out of bed  -MB     Recorded by [CL] Nava Schaeffer, OT 05/21/18 1522 [MB] Rosi Bautista, PT 05/21/18 1050     Row Name 05/21/18 0930             Safety Issues, Functional Mobility    Impairments Affecting Function (Mobility) balance;endurance/activity tolerance;shortness of breath;strength  -MB      Recorded by [MB] Rosi Bautista, PT 05/21/18 1050      Row Name 05/21/18 1419 05/21/18 0930           Bed Mobility Assessment/Treatment    Bed Mobility Assessment/Treatment supine-sit;sit-supine  -CL  --     Supine-Sit [place] (Bed Mobility) conditional independence  -CL independent  -MB     Sit-Supine [place] (Bed Mobility) conditional independence  -CL  --     Assistive Device (Bed Mobility) bed rails;head of bed elevated  -CL head of bed elevated  -MB     Comment (Bed Mobility)  -- Pt. demo good safety awareness w/ bed mobility.  -MB     Recorded by [CL] Nava Schaeffer, OT 05/21/18 1522 [MB] Rosi Bautista, PT 05/21/18 1050     Row Name 05/21/18 1419             Functional Mobility    Functional Mobility- Ind. Level contact guard assist;verbal cues required  -CL      Functional Mobility-Distance (Feet) 30  -CL      Functional Mobility- Comment Pt ambulated to/from the bathroom.   -CL      Recorded by [CL] Nava Schaeffer, OT 05/21/18 1522      Row Name 05/21/18 1419 05/21/18 0930          Transfer Assessment/Treatment    Transfer Assessment/Treatment sit-stand transfer;stand-sit transfer;toilet transfer  -CL  --     Sit-Stand [place] (Transfers) stand by assist  -CL stand by assist  -MB     Stand-Sit [place] (Transfers) stand by assist  -CL stand by assist;verbal cues  -MB     [place] Level (Toilet Transfer) stand by assist  -CL  --     Recorded by [CL] Nava Schaeffer, OT 05/21/18 1522 [MB] Rosi Bautista, PT 05/21/18 1050     Row Name 05/21/18 0930             Sit-Stand Transfer    Assistive Device (Sit-Stand Transfers) other (see comments)   no AD; gait belt  -MB      Recorded by [MB] Rosi Bautista, PT 05/21/18 1050      Row Name 05/21/18 0930             Stand-Sit Transfer    Assistive Device (Stand-Sit Transfers) other (see comments)   no AD; gait belt  -MB      Recorded by [MB] Rosi Bautista, PT 05/21/18 1050      Row Name 05/21/18 1419             Toilet Transfer    Type (Toilet Transfer) sit-stand;stand-sit  -CL      Recorded by [CL] Nava Schaeffer, OT 05/21/18 1522       Row Name 05/21/18 0930             Gait/Stairs Assessment/Training    [place] Level (Gait) stand by assist;verbal cues  -MB      Assistive Device (Gait) other (see comments)   no AD; gait belt; supplemental oxygen  -MB2      Distance in Feet (Gait) 100  -MB      Pattern (Gait) step-through  -MB      Deviations/Abnormal Patterns (Gait) base of support, narrow;clarissa decreased;stride length decreased  -MB2      Bilateral Gait Deviations heel strike decreased  -MB2      Comment (Gait/Stairs) Pt. ambulated 100ft x 2 and demo step-through gait pattern w/ decreased clarissa.  Pt. required VCs for PLB and inc. B heelstrike/step length, and 1 standing rest break d/t SOA.  Pt's O2 sats decreased to 81% post ambulation, on 2L.     -MB2      Recorded by [MB] Rosi Bautista, PT 05/21/18 1050  [MB2] Rosi Bautista, PT 05/21/18 1058      Row Name 05/21/18 1419             ADL Assessment/Intervention    BADL Assessment/Intervention toileting;grooming  -CL      Recorded by [CL] Nava Schaeffer, OT 05/21/18 1522      Row Name 05/21/18 1419             Grooming Assessment/Training    [place] Level (Grooming) wash face, hands;independent  -CL      Grooming Position sink side;unsupported standing  -CL      Recorded by [CL] Nava Schaeffer, OT 05/21/18 1522      Row Name 05/21/18 1419             Toileting Assessment/Training    [place] Level (Toileting) adjust/manage clothing;perform perineal hygiene;independent  -CL      Toileting Position unsupported sitting;unsupported standing  -CL      Recorded by [CL] Nava Schaeffer, OT 05/21/18 1522      Row Name 05/21/18 1419 05/21/18 0930          Therapeutic Exercise    Therapeutic Exercise seated, upper extremities  -CL seated, lower extremities  -MB     Additional Documentation  -- Therapeutic Exercise (Row)  -MB     Recorded by [CL] Nava Schaeffer, OT 05/21/18 1522 [MB] Rosi Bautista, PT 05/21/18 1058     Row Name 05/21/18 1419             Upper Extremity Seated Therapeutic  Exercise    Performed, Seated Upper Extremity (Therapeutic Exercise) shoulder flexion/extension;shoulder abduction/adduction;shoulder horizontal abduction/adduction;scapular protraction/retraction;elbow flexion/extension  -CL      Exercise Type, Seated Upper Extremity (Therapeutic Exercise) AROM (active range of motion)  -CL      Sets/Reps Detail, Seated Upper Extremity (Therapeutic Exercise) 1/10  -CL      Comment, Seated Upper Extremity (Therapeutic Exercise) BUE w/ PLB  -CL      Recorded by [CL] Nava Schaeffer OT 05/21/18 1522      Row Name 05/21/18 0930             Lower Extremity Seated Therapeutic Exercise    Performed, Seated Lower Extremity (Therapeutic Exercise) hip flexion/extension;LAQ (long arc quad), knee extension;ankle dorsiflexion/plantarflexion  -MB      Exercise Type, Seated Lower Extremity (Therapeutic Exercise) AROM (active range of motion)  -MB      Sets/Reps Detail, Seated Lower Extremity (Therapeutic Exercise) 1 set / 15 reps BLEs  -MB      Comment, Seated Lower Extremity (Therapeutic Exercise) frequent rest breaks d/t SOA  -MB      Recorded by [MB] Rosi Bautista, PT 05/21/18 1058      Row Name 05/21/18 0930             Therapeutic Exercise    Lower Extremity (Therapeutic Exercise) --  -MB      Recorded by [MB] Rosi Bautista, PT 05/21/18 1058      Row Name 05/21/18 1419             Balance    Balance static sitting balance;static standing balance;dynamic standing balance  -CL      Recorded by [CL] Nava Schaeffer OT 05/21/18 1522      Row Name 05/21/18 1419             Static Sitting Balance    Level of [place] (Unsupported Sitting, Static Balance) independent  -CL      Sitting Position (Unsupported Sitting, Static Balance) sitting on edge of bed  -CL      Recorded by [CL] Nava Schaeffer OT 05/21/18 1522      Row Name 05/21/18 1419             Dynamic Sitting Balance    Level of [place], Reaches Outside Midline (Sitting, Dynamic Balance) independent  -CL      Sitting Position,  Reaches Outside Midline (Sitting, Dynamic Balance) sitting on edge of bed  -CL      Recorded by [CL] Nava Schaeffer, OT 05/21/18 1522      Row Name 05/21/18 1419             Static Standing Balance    Level of [place] (Supported Standing, Static Balance) supervision  -CL      Comment (Supported Standing, Static Balance) retrieving object from floor  -CL      Recorded by [CL] Nava Schaeffer, OT 05/21/18 1522      Row Name 05/21/18 0930             Dynamic Standing Balance    Level of [place], Reaches Outside Midline (Standing, Dynamic Balance) contact guard assist   mini-squats x 5 reps, inc. SOA  -MB      Recorded by [MB] Rosi Bautista, PT 05/21/18 1058      Row Name 05/21/18 1419 05/21/18 0930          Positioning and Restraints    Pre-Treatment Position in bed  -CL in bed  -MB     Post Treatment Position bed  -CL chair  -MB     In Bed notified nsg;fowlers;call light within reach;encouraged to call for assist  -CL  --     In Chair  -- notified nsg;reclined;call light within reach;encouraged to call for assist;legs elevated  -MB     Recorded by [CL] Nava Schaeffer, OT 05/21/18 1522 [MB] Rosi Bautista, PT 05/21/18 1058     Row Name 05/21/18 1419 05/21/18 0930          Pain Scale: Numbers Pre/Post-Treatment    Pain Scale: Numbers, Pretreatment 0/10 - no pain  -CL 3/10  -MB     Pain Scale: Numbers, Post-Treatment 0/10 - no pain  -CL 3/10  -MB     Pain Location - Side  -- Bilateral  -MB     Pain Location  -- knee  -MB     Pain Intervention(s)  -- Repositioned;Ambulation/increased activity  -MB     Recorded by [CL] Nava Schaeffer, OT 05/21/18 1522 [MB] Rosi Bautista, PT 05/21/18 1058     Row Name 05/21/18 0930             Plan of Care Review    Plan of Care Reviewed With patient  -MB      Recorded by [MB] Rois Bautista, PT 05/21/18 1058      Row Name 05/21/18 0930             Outcome Summary/Treatment Plan (PT)    Daily Summary of Progress (PT) progress toward functional goals is good  -MB      Plan for  Continued Treatment (PT) Cont IPPT per POC.  -MB      Anticipated Discharge Disposition (PT) inpatient rehab facility   pulmonary rehab  -MB      Recorded by [MB] Rosi Bautista, PT 05/21/18 1058        User Key  (r) = Recorded By, (t) = Taken By, (c) = Cosigned By    Initials Name Effective Dates Discipline    ANDREWS Bautista, PT 03/14/16 -  PT    CL Nava Schaeffer, OT 04/03/18 -  OT               Occupational Therapy Education     Title: PT OT SLP Therapies (Active)     Topic: Occupational Therapy (Active)     Point: ADL training (Active)     Description: Instruct learner(s) on proper safety adaptation and remediation techniques during self care or transfers.   Instruct in proper use of assistive devices.   Learning Progress Summary     Learner Status Readiness Method Response Comment Documented by    Patient Active Acceptance E NR Pt educated on appropriate safety precautions, t/f techniques, BUE HEP w/ PLB, EC techniques, and role of OT. CL 05/21/18 1522     Done Acceptance E VU role OT, reason for consult, goal setting. DANA 05/18/18 1026          Point: Home exercise program (Active)     Description: Instruct learner(s) on appropriate technique for monitoring, assisting and/or progressing therapeutic exercises/activities.   Learning Progress Summary     Learner Status Readiness Method Response Comment Documented by    Patient Active Acceptance E NR Pt educated on appropriate safety precautions, t/f techniques, BUE HEP w/ PLB, EC techniques, and role of OT. CL 05/21/18 1522     Done Acceptance E,D,H VU,NR UE HEP issued, PLB practiced, but pt. with much difficulty following, EC/WS handout issued and review.  Pt. able verbalize some things can change. DANA 05/20/18 1556          Point: Precautions (Active)     Description: Instruct learner(s) on prescribed precautions during self-care and functional transfers.   Learning Progress Summary     Learner Status Readiness Method Response Comment Documented by     Patient Active Acceptance E NR Pt educated on appropriate safety precautions, t/f techniques, BUE HEP w/ PLB, EC techniques, and role of OT. CL 05/21/18 1522          Point: Body mechanics (Active)     Description: Instruct learner(s) on proper positioning and spine alignment during self-care, functional mobility activities and/or exercises.   Learning Progress Summary     Learner Status Readiness Method Response Comment Documented by    Patient Active Acceptance E NR Pt educated on appropriate safety precautions, t/f techniques, BUE HEP w/ PLB, EC techniques, and role of OT. CL 05/21/18 1522     Done Acceptance E,D,H VU,NR UE HEP issued, PLB practiced, but pt. with much difficulty following, EC/WS handout issued and review.  Pt. able verbalize some things can change.  05/20/18 1556                      User Key     Initials Effective Dates Name Provider Type Discipline    DANA 06/22/15 -  Kristy Winkler, OT Occupational Therapist OT    CL 04/03/18 -  Nava Schaeffer, OT Occupational Therapist OT                OT Recommendation and Plan     Plan of Care Review  Plan of Care Reviewed With: patient  Plan of Care Reviewed With: patient  Outcome Summary: Pt SBA for t/fs, CGA for functional mobility, and independent w/ ADLs this date. Pt educated on EC principles and BUE HEP w/ PLB. Recommend cont skilled IPOT POC.         Outcome Measures     Row Name 05/21/18 1419 05/21/18 0930 05/20/18 1526       How much help from another person do you currently need...    Turning from your back to your side while in flat bed without using bedrails?  -- 4  -MB  --    Moving from lying on back to sitting on the side of a flat bed without bedrails?  -- 4  -MB  --    Moving to and from a bed to a chair (including a wheelchair)?  -- 3  -MB  --    Standing up from a chair using your arms (e.g., wheelchair, bedside chair)?  -- 3  -MB  --    Climbing 3-5 steps with a railing?  -- 2  -MB  --    To walk in hospital room?  -- 3  -MB  --    AM-PAC  6 Clicks Score  -- 19  -MB  --       How much help from another is currently needed...    Putting on and taking off regular lower body clothing? 3  -CL  -- 4   as long as pt. has multiple rest periods available.  -DANA    Bathing (including washing, rinsing, and drying) 2  -CL  -- 4  -DANA    Toileting (which includes using toilet bed pan or urinal) 3  -CL  -- 4  -DANA    Putting on and taking off regular upper body clothing 3  -CL  -- 4  -DANA    Taking care of personal grooming (such as brushing teeth) 3  -CL  -- 4  -DANA    Eating meals 4  -CL  -- 4  -DANA    Score 18  -CL  -- 24  -DANA       Functional Assessment    Outcome Measure Options AM-PAC 6 Clicks Daily Activity (OT)  -CL AM-PAC 6 Clicks Basic Mobility (PT)  -MB AM-PAC 6 Clicks Daily Activity (OT)  -DANA      User Key  (r) = Recorded By, (t) = Taken By, (c) = Cosigned By    Initials Name Provider Type    DANA Kristy Winkler, OT Occupational Therapist    MB Rosi Bautista, PT Physical Therapist    CL Nava Schaeffer OT Occupational Therapist           Time Calculation:         Time Calculation- OT     Row Name 05/21/18 1525             Time Calculation- OT    OT Start Time 1419  -CL      Total Timed Code Minutes- OT 12 minute(s)  -CL      OT Received On 05/21/18  -      OT Goal Re-Cert Due Date 05/28/18  -CL        User Key  (r) = Recorded By, (t) = Taken By, (c) = Cosigned By    Initials Name Provider Type    CL Nava Schaeffer OT Occupational Therapist           Therapy Charges for Today     Code Description Service Date Service Provider Modifiers Qty    [id number]  OT THERAPEUTIC ACT EA 15 MIN 5/21/2018 Nava Schaeffer OT GO 1               Nava Schaeffer OT  5/21/2018

## 2018-05-21 NOTE — PROGRESS NOTES
Continued Stay Note  [facility]     Patient Name: Lalita Valentino  MRN: [id number]  Today's Date: 5/21/2018    Admit Date: 5/15/2018          Discharge Plan     Row Name 05/21/18 1333       Plan    Plan update    Patient/Family in Agreement with Plan yes    Plan Comments Spoke to Yumi with [facility] who indicates that she will try to get a bed for patient tomorrow but will need to call back to confirm.  Spoke to patient at bedside regarding discharge plan.  Patient still intends to discharge to [facility] but asks for clothing due to her clothes being cut off upon admission to hospital.  SW notified of clothing needs.  No other discharge needs noted at this time.  CM following.  Patient plan is to discharge home via car with family to transport when medically ready and bed available.     Final Discharge Disposition Code 62 - inpatient rehab facility              Discharge Codes    No documentation.       Expected Discharge Date and Time     Expected Discharge Date Expected Discharge Time    May 21, 2018             Sameera Burks RN

## 2018-05-21 NOTE — PLAN OF CARE
Problem: Patient Care Overview  Goal: Plan of Care Review  Outcome: Ongoing (interventions implemented as appropriate)   05/21/18 1523   Coping/Psychosocial   Plan of Care Reviewed With patient   OTHER   Outcome Summary Pt SBA for t/fs, CGA for functional mobility, and independent w/ ADLs this date. Pt educated on EC principles and BUE HEP w/ PLB. Recommend cont skilled IPOT POC.

## 2018-05-21 NOTE — PROGRESS NOTES
[facility] Medicine Services  PROGRESS NOTE    Patient Name: Lalita Valentino  : 1965  MRN: [id number]    Date of Admission: 5/15/2018  Length of Stay: 5  Primary Care Physician: Skye Francisco MD    Subjective   Subjective     CC:  Acute Resp Failure f/u    HPI:  Pt still has cough, no wheezing, no SOA, on home dose of O2.      Review of Systems  Gen- No fevers, chills  CV- No chest pain, palpitations  GI- No N/V/D, abd pain     Otherwise ROS is negative except as mentioned in the HPI.    Objective   Objective     Vital Signs:   Temp:  [97.9 °F (36.6 °C)-98.7 °F (37.1 °C)] 98.5 °F (36.9 °C)  Heart Rate:  [] 91  Resp:  [15-18] 16  BP: (104-137)/() 105/71        Physical Exam:  Constitutional: No acute distress, awake, alert  HENT: NCAT, mucous membranes moist  Respiratory:  Rhonchi, no wheezing, respiratory effort normal   Cardiovascular: RRR, no murmurs, rubs, or gallops, palpable pedal pulses bilaterally  Gastrointestinal: Positive bowel sounds, soft, nontender, nondistended  Musculoskeletal: No bilateral ankle edema  Psychiatric: Appropriate affect, cooperative  Neurologic: Oriented x 3, strength symmetric in all extremities, Cranial Nerves grossly intact to confrontation, speech clear  Skin: No rashes    Results Reviewed:  I have personally reviewed current lab, radiology, and data and agree.      Results from last 7 days  Lab Units 18  0630 18  0413 18  0523 05/15/18  1921   WBC 10*3/mm3 12.69* 10.63 12.13* 16.26*   HEMOGLOBIN g/dL 11.9 10.8* 10.3* 12.0   HEMATOCRIT % 38.7 35.6 33.1* 39.9   PLATELETS 10*3/mm3 236 217 177 312   INR   --   --   --  0.97       Results from last 7 days  Lab Units 18  0630 18  0413 18  1243 18  0523 05/15/18  1921   SODIUM mmol/L 142 141  --  137 139   POTASSIUM mmol/L 4.0 4.5  --  4.3 4.7   CHLORIDE mmol/L 102 106  --  103 103   CO2 mmol/L 29.0 29.0  --  29.0 29.0   BUN mg/dL 24* 23  --  23  24*   CREATININE mg/dL 0.80 0.70  --  0.80 0.90   GLUCOSE mg/dL 96 122*  --  122* 131*   CALCIUM mg/dL 9.4 9.1  --  8.6* 8.6*   ALT (SGPT) U/L 25 20  --  21 31   AST (SGOT) U/L 21 17  --  18 27   TROPONIN I ng/mL  --  0.010 0.018  --  0.045*     Estimated Creatinine Clearance: 88.1 mL/min (by C-G formula based on SCr of 0.8 mg/dL).  No results found for: BNP  No results found for: PHART    Microbiology Results Abnormal     Procedure Component Value - Date/Time    Blood Culture - Blood, [[id number]]  (Normal) Collected:  05/16/18 0523    Lab Status:  Preliminary result Specimen:  Blood from Hand, Right Updated:  05/20/18 0700     Blood Culture No growth at 4 days    Blood Culture - Blood, [[id number]]  (Normal) Collected:  05/15/18 2028    Lab Status:  Preliminary result Specimen:  Blood from Hand, Right Updated:  05/19/18 2115     Blood Culture No growth at 4 days    Legionella Antigen, Urine - Urine, Urine, Clean Catch [[id number]] Collected:  05/15/18 2138    Lab Status:  Final result Specimen:  Urine from Urine, Clean Catch Updated:  05/18/18 1518     L. pneumophila Serogp 1 Ur Ag Negative     Comment: Presumptive negative for L. pneumophila serogroup 1 antigen in urine,  suggesting no recent or current infection. Legionnaires' disease  cannot be ruled out since other serogroups and species may also cause  disease.       Narrative:       Performed at:  [address] [place] [address]  [id number]  : Mika Jackson MD, Phone:  [phone]    S. Pneumo Ag Urine or CSF - Urine, Urine, Clean Catch [[id number]] Collected:  05/15/18 2138    Lab Status:  Final result Specimen:  Urine from Urine, Clean Catch Updated:  05/18/18 1518     Specimen Source Urine     STREP PNEUMONIAE ANTIGEN Negative     Body Fluid Culture, Sterile Not Indicated     Organism ID Not indicated.     Please note Comment     Comment: College of American Pathologists standards require a culture to be  performed on  CSF specimens submitted for bacterial antigen testing.  (CAP [id number]) Urine specimens will not be cultured.       Narrative:       [facility]formed at:  [facility]  [address] [place] [address]  [id number]  : Mika Jackson MD, Phone:  [phone]    Respiratory Culture - Sputum, ET Suction [[id number]] Collected:  05/16/18 0110    Lab Status:  Final result Specimen:  Sputum from ET Suction Updated:  05/18/18 0833     Respiratory Culture No growth at 2 days     Gram Stain Result Few (2+) WBCs per low power field      Rare (1+) Epithelial cells per low power field      Rare (1+) Gram positive cocci in pairs and chains      Rare (1+) Normal respiratory reji          Imaging Results (last 24 hours)     Procedure Component Value Units Date/Time    CT Abdomen Pelvis With & Without Contrast [[id number]] Collected:  05/16/18 1637     Updated:  05/19/18 2317    Narrative:       EXAMINATION: CT ABDOMEN AND PELVIS W WO CONTRAST-05/16/2018:      INDICATION: Bowel obstruction, low grade.         TECHNIQUE: 5 mm postoral contrast images, post-IV contrast portal venous  phase and 5 mm delayed venous phase images through the abdomen and  pelvis.     The radiation dose reduction device was turned on for each scan per the  ALARA (As Low as Reasonably Achievable) protocol.     COMPARISON: NONE.     FINDINGS: The patient history indicates generalized abdominal pain,  abdominal distention, possible bowel obstruction.     There is patchy disease in the lingula and left lower lobe, suspicious  for mild changes of pneumonia. Only minimal interstitial changes are  present in the right base. There is no pleural effusion.     There is a small nonenhancing left lobe liver cyst. The liver otherwise  appears unremarkable. The gallbladder is distended but otherwise appears  within normal limits. The spleen is normal in size and contains  granulomatous calcifications. Pancreas, adrenal glands, and kidneys  appear within  "normal limits. No upper abdominal free air, ascites,  adenopathy, or acute inflammatory change is seen.     Large and small bowel loops are mostly well contrast opacified. No  abnormally dilated small bowel loops are seen. Contrast passes readily  into the terminal ileum and cecum, and into the right colon. There is a  moderate amount of formed stool throughout the colon, but no obvious  impaction. The colon itself is mildly distended with gas and stool, but  no gross abnormalities are seen. In particular, no inflammatory change,  mucosal thickening, etc., is seen of the colon.     Elsewhere in the pelvis, the bladder is decompressed by a Bhardwaj balloon  catheter. Delayed images show good contrast opacification of the renal  collecting systems, ureters, and bladder. The uterus appears to be  present but atrophic. Ovaries are not definitely identified presumably  atrophic or absent. There is a small normal sized contrast-filled  retrocecal appendix. No intrapelvic mass or inflammatory change is seen.       Impression:       1. \"Gassy\" appearance of the abdomen, mostly as a result of mild  distention of the colon with air and considerable formed stool. While  this may represent moderate fecal stasis, there is no obvious impaction  or evidence of inflammation. No significant resulting small bowel  distention.  2. No evidence of acute inflammatory process or other acute  intra-abdominal or intrapelvic disease elsewhere.  3. Patchy lingular and left lower lobe disease, of some concern for mild  changes of pneumonia or aspiration. Minimal right lower lung disease.     D:  05/16/2018  E:  05/16/2018           This report was finalized on 5/19/2018 11:11 PM by DR. Remberto Connolly MD.           Results for orders placed during the hospital encounter of 05/15/18   Adult Transthoracic Echo Complete W/ Cont if Necessary Per Protocol    Narrative · Mild mitral valve regurgitation is present.  · Mild tricuspid valve regurgitation is " "present.  · Calculated right ventricular systolic pressure from tricuspid   regurgitation is 31 mmHg.  · The following left ventricular wall segments are hypokinetic: basal   inferolateral, mid inferior and basal inferior.  · Left ventricular systolic function is normal. Estimated EF = 56%.  · Left ventricular diastolic dysfunction (grade I) consistent with   impaired relaxation.  · There is no evidence of pericardial effusion.  · No evidence of pulmonary hypertension is present.  · The aortic valve exhibits sclerosis.  · Normal right ventricular cavity size, wall thickness, systolic function   and septal motion noted.          I have reviewed the medications.    Assessment/Plan   Assessment / Plan     Hospital Problem List     Acute on chronic respiratory failure with hypoxia and hypercapnia    Chronic obstructive pulmonary disease with acute exacerbation    Anxiety and depression    Hypothyroid    Pneumonia due to infectious organism             Brief Hospital Course to date:  Lalita Valentino is a 52 y.o. female with relevant PMHx of COPD, anxiety, depression, hypothyroidism, found \"tripoding\" at home by EMS, recurrent admissions for respiratory failure, intubated 4 times in past, who was intubated emergently in [facility] ER admitted to [facility] ICU on 5/15/2018 w/ AECOPD +/- pneumonia.   ECHO done 5/16/18 showed:  · Mild mitral valve regurgitation is present.  · Mild tricuspid valve regurgitation is present.  · Calculated right ventricular systolic pressure from tricuspid regurgitation is 31 mmHg.  · The following left ventricular wall segments are hypokinetic: basal inferolateral, mid inferior and basal inferior.  · Left ventricular systolic function is normal. Estimated EF = 56%.  · Left ventricular diastolic dysfunction (grade I) consistent with impaired relaxation.  · There is no evidence of pericardial effusion.  · No evidence of pulmonary hypertension is present.  · The aortic valve exhibits " sclerosis.  · Normal right ventricular cavity size, wall thickness, systolic function and septal motion noted.        Assessment & Plan:  AECOPD - continue nebs, continue pred to 40 daily for aother 2 days.  On chronic daily azithro and roflumilast for recurrent exacerbations.   legionella and strep Ag negative, Blood Cx NGTD, Sputum Cx NGTD.     Abdominal Distention - CT Abd/Pelvis neg, feels better.     Mildly Elevated Troponin - probably some demand ischemia from COPD, repeat trop neg.  F/u w/ cardiology as outpatient for WMA.  May need stress test.     Dysphagia - f/u GI as outpatient     Nutrition - Diet Regular     Ambulate     DVT Prophylaxis:  SCDs/Lovenox/PPI     CODE STATUS: Full Code     Disposition: I expect the patient to be discharged to rehab in 1-2 days, waiting on Bed.      Electronically signed by Leon Sams MD, 05/20/18, 9:05 PM.

## 2018-05-21 NOTE — PROGRESS NOTES
[facility] Medicine Services  PROGRESS NOTE    Patient Name: Lalita Valentino  : 1965  MRN: [id number]    Date of Admission: 5/15/2018  Length of Stay: 6  Primary Care Physician: Skye Francisco MD    Subjective   Subjective     CC:  Acute Resp Failure f/u    HPI:  Feels like her breathing is approaching baseline, she is sore in her back and chest    Review of Systems  Gen- No fevers, chills  CV- No chest pain, palpitations  GI- No N/V/D, abd pain     Otherwise ROS is negative except as mentioned in the HPI.    Objective   Objective     Vital Signs:   Temp:  [97.6 °F (36.4 °C)-98.7 °F (37.1 °C)] 97.6 °F (36.4 °C)  Heart Rate:  [] 101  Resp:  [15-16] 16  BP: (104-128)/(71-76) 128/76        Physical Exam:  Gen-no acute distress, sitting up in bed  CV-RRR, S1 S2 normal, no m/r/g  Resp-decreased throughout, normal WOB  Abd-soft, NT, ND, +BS  Ext-no edema, PPP  Neuro-A&Ox3, no focal deficits, is jittery  Psych-appropriate mood    Results Reviewed:  I have personally reviewed current lab, radiology, and data and agree.      Results from last 7 days  Lab Units 18  0630 18  0413 18  0523 05/15/18  1921   WBC 10*3/mm3 12.69* 10.63 12.13* 16.26*   HEMOGLOBIN g/dL 11.9 10.8* 10.3* 12.0   HEMATOCRIT % 38.7 35.6 33.1* 39.9   PLATELETS 10*3/mm3 236 217 177 312   INR   --   --   --  0.97       Results from last 7 days  Lab Units 18  0630 18  0413 18  1243 18  0523 05/15/18  1921   SODIUM mmol/L 142 141  --  137 139   POTASSIUM mmol/L 4.0 4.5  --  4.3 4.7   CHLORIDE mmol/L 102 106  --  103 103   CO2 mmol/L 29.0 29.0  --  29.0 29.0   BUN mg/dL 24* 23  --  23 24*   CREATININE mg/dL 0.80 0.70  --  0.80 0.90   GLUCOSE mg/dL 96 122*  --  122* 131*   CALCIUM mg/dL 9.4 9.1  --  8.6* 8.6*   ALT (SGPT) U/L 25 20  --  21 31   AST (SGOT) U/L 21 17  --  18 27   TROPONIN I ng/mL  --  0.010 0.018  --  0.045*     Estimated Creatinine Clearance: 88.1 mL/min (by  C-G formula based on SCr of 0.8 mg/dL).    Microbiology Results Abnormal     Procedure Component Value - Date/Time    Blood Culture - Blood, [[id number]]  (Normal) Collected:  05/16/18 0523    Lab Status:  Final result Specimen:  Blood from Hand, Right Updated:  05/21/18 0700     Blood Culture No growth at 5 days    Blood Culture - Blood, [[id number]]  (Normal) Collected:  05/15/18 2028    Lab Status:  Final result Specimen:  Blood from Hand, Right Updated:  05/20/18 2115     Blood Culture No growth at 5 days    Legionella Antigen, Urine - Urine, Urine, Clean Catch [[id number]] Collected:  05/15/18 2138    Lab Status:  Final result Specimen:  Urine from Urine, Clean Catch Updated:  05/18/18 1518     L. pneumophila Serogp 1 Ur Ag Negative     Comment: Presumptive negative for L. pneumophila serogroup 1 antigen in urine,  suggesting no recent or current infection. Legionnaires' disease  cannot be ruled out since other serogroups and species may also cause  disease.       Narrative:       Performed at:  [address]  [id number]  : Mika Jackson MD, Phone:  [phone]    S. Pneumo Ag Urine or CSF - Urine, Urine, Clean Catch [[id number]] Collected:  05/15/18 2138    Lab Status:  Final result Specimen:  Urine from Urine, Clean Catch Updated:  05/18/18 1518     Specimen Source Urine     STREP PNEUMONIAE ANTIGEN Negative     Body Fluid Culture, Sterile Not Indicated     Organism ID Not indicated.     Please note Comment     Comment: College of American Pathologists standards require a culture to be  performed on CSF specimens submitted for bacterial antigen testing.  (CAP [id number]) Urine specimens will not be cultured.       Narrative:       Performed at:  [address] - [address]  [id number]  : Mika Jackson MD, Phone:  [phone]    Respiratory Culture - Sputum, ET Suction [[id number]] Collected:  05/16/18 0110    Lab  "Status:  Final result Specimen:  Sputum from ET Suction Updated:  05/18/18 0823     Respiratory Culture No growth at 2 days     Gram Stain Result Few (2+) WBCs per low power field      Rare (1+) Epithelial cells per low power field      Rare (1+) Gram positive cocci in pairs and chains      Rare (1+) Normal respiratory reji        Results for orders placed during the hospital encounter of 05/15/18   Adult Transthoracic Echo Complete W/ Cont if Necessary Per Protocol    Narrative · Mild mitral valve regurgitation is present.  · Mild tricuspid valve regurgitation is present.  · Calculated right ventricular systolic pressure from tricuspid   regurgitation is 31 mmHg.  · The following left ventricular wall segments are hypokinetic: basal   inferolateral, mid inferior and basal inferior.  · Left ventricular systolic function is normal. Estimated EF = 56%.  · Left ventricular diastolic dysfunction (grade I) consistent with   impaired relaxation.  · There is no evidence of pericardial effusion.  · No evidence of pulmonary hypertension is present.  · The aortic valve exhibits sclerosis.  · Normal right ventricular cavity size, wall thickness, systolic function   and septal motion noted.          I have reviewed the medications.    Assessment/Plan   Assessment / Plan     Hospital Problem List     Acute on chronic respiratory failure with hypoxia and hypercapnia    Chronic obstructive pulmonary disease with acute exacerbation    Anxiety and depression    Hypothyroid    Pneumonia due to infectious organism             Brief Hospital Course to date:  Lalita Valentino is a 52 y.o. female with relevant PMHx of COPD, anxiety, depression, hypothyroidism, found \"tripoding\" at home by EMS, recurrent admissions for respiratory failure, intubated 4 times in past, who was intubated emergently in [facility] ER admitted to [facility] ICU on 5/15/2018 w/ AECOPD +/- pneumonia.      Assessment & Plan:  AECOPD - continue nebs, start steroid " taper (recommend longerish taper).  On chronic daily azithro and roflumilast for recurrent exacerbations.   legionella and strep Ag negative, Blood Cx NGTD, Sputum Cx NGTD.     Abdominal Distention - CT Abd/Pelvis neg, feels better.     Mildly Elevated Troponin - probably some demand ischemia from COPD, repeat trop neg.  F/u w/ cardiology as outpatient for WMA.  May need stress test.     Dysphagia - f/u GI as outpatient      DVT Prophylaxis:  SCDs/Lovenox     CODE STATUS: Full Code     Disposition: I expect the patient to be discharged to rehab when bed available      Electronically signed by YESY Mcgrath, 05/21/18, 12:32 PM.

## 2018-05-21 NOTE — THERAPY TREATMENT NOTE
Acute Care - Physical Therapy Treatment Note  [facility]     Patient Name: Lalita Valentino  : 1965  MRN: [id number]  Today's Date: 2018  Onset of Illness/Injury or Date of Surgery: 05/15/18  Date of Referral to PT: 18  Referring Physician: MD Peter    Admit Date: 5/15/2018    Visit Dx:    ICD-10-CM ICD-9-CM   1. Impaired functional mobility, balance, gait, and endurance Z74.09 V49.89   2. Impaired mobility and ADLs Z74.09 799.89     Patient Active Problem List   Diagnosis   • Acute on chronic respiratory failure with hypoxia and hypercapnia   • Chronic obstructive pulmonary disease with acute exacerbation   • Anxiety and depression   • Hypothyroid   • Pneumonia due to infectious organism       Therapy Treatment          Rehabilitation Treatment Summary     Row Name 1830             Treatment Time/Intention    Discipline physical therapist  -MB      Document Type therapy note (daily note)  -MB      Subjective Information complains of;pain;dyspnea  -MB      Mode of Treatment physical therapy;individual therapy  -MB      Patient/Family Observations Pt. supine, on 2L/min O2 per NC, IV heplocked.  RN consent for PT.  No family present at bedside.  -MB      Care Plan Review care plan/treatment goals reviewed;patient/other agree to care plan  -MB      Therapy Frequency (PT Clinical Impression) daily  -MB      Patient Effort good  -MB      Comment SOA  -MB      Existing Precautions/Restrictions fall;oxygen therapy device and L/min  -MB      Recorded by [MB] Rosi Bautista, PT 18 1050      Row Name 18 0930             Vital Signs    Pre Systolic BP Rehab 128  -MB      Pre Treatment Diastolic   -MB      Post Systolic BP Rehab 124  -MB      Post Treatment Diastolic BP 87  -MB      Pretreatment Heart Rate (beats/min) 95  -MB      Posttreatment Heart Rate (beats/min) 107  -MB      Pre SpO2 (%) 98  -MB      O2 Delivery Pre Treatment supplemental O2  -MB      Intra SpO2 (%) 81   -MB      O2 Delivery Intra Treatment supplemental O2  -MB      Post SpO2 (%) 96  -MB      O2 Delivery Post Treatment supplemental O2  -MB      Pre Patient Position Supine  -MB      Intra Patient Position Standing  -MB      Post Patient Position Standing  -MB      Recorded by [MB] Rosi Bautista, PT 05/21/18 1050      Row Name 05/21/18 0930             Cognitive Assessment/Intervention- PT/OT    Affect/Mental Status (Cognitive) WFL  -MB      Orientation Status (Cognition) oriented x 4  -MB      Follows Commands (Cognition) WFL  -MB      Safety Deficit (Cognitive) mild deficit;ability to follow commands;safety precautions awareness  -MB      Personal Safety Interventions fall prevention program maintained;gait belt;muscle strengthening facilitated;nonskid shoes/slippers when out of bed  -MB      Recorded by [MB] Rosi Bautista, PT 05/21/18 1050      Row Name 05/21/18 0930             Safety Issues, Functional Mobility    Impairments Affecting Function (Mobility) balance;endurance/activity tolerance;shortness of breath;strength  -MB      Recorded by [MB] Rosi Bautista, PT 05/21/18 1050      Row Name 05/21/18 0930             Bed Mobility Assessment/Treatment    Supine-Sit [place] (Bed Mobility) independent  -MB      Assistive Device (Bed Mobility) head of bed elevated  -MB      Comment (Bed Mobility) Pt. demo good safety awareness w/ bed mobility.  -MB      Recorded by [MB] Rosi Bautsita, PT 05/21/18 1050      Row Name 05/21/18 0930             Transfer Assessment/Treatment    Sit-Stand [place] (Transfers) stand by assist  -MB      Stand-Sit [place] (Transfers) stand by assist;verbal cues  -MB      Recorded by [MB] Rosi Bautista, PT 05/21/18 1050      Row Name 05/21/18 0930             Sit-Stand Transfer    Assistive Device (Sit-Stand Transfers) other (see comments)   no AD; gait belt  -MB      Recorded by [MB] Rosi Bautista, PT 05/21/18 1050      Row Name 05/21/18 0930              Stand-Sit Transfer    Assistive Device (Stand-Sit Transfers) other (see comments)   no AD; gait belt  -MB      Recorded by [MB] Rosi Bautista, PT 05/21/18 1050      Row Name 05/21/18 0930             Gait/Stairs Assessment/Training    Young Level (Gait) stand by assist;verbal cues  -MB      Assistive Device (Gait) other (see comments)   no AD; gait belt; supplemental oxygen  -MB2      Distance in Feet (Gait) 100  -MB      Pattern (Gait) step-through  -MB      Deviations/Abnormal Patterns (Gait) base of support, narrow;clarissa decreased;stride length decreased  -MB2      Bilateral Gait Deviations heel strike decreased  -MB2      Comment (Gait/Stairs) Pt. ambulated 100ft x 2 and demo step-through gait pattern w/ decreased clarissa.  Pt. required VCs for PLB and inc. B heelstrike/step length, and 1 standing rest break d/t SOA.  Pt's O2 sats decreased to 81% post ambulation, on 2L.     -MB2      Recorded by [MB] Rosi Bautista, PT 05/21/18 1050  [MB2] Rosi Bautista, PT 05/21/18 1058      Row Name 05/21/18 0930             Therapeutic Exercise    Therapeutic Exercise seated, lower extremities  -MB      Additional Documentation Therapeutic Exercise (Row)  -MB      Recorded by [MB] Rosi Bautista, PT 05/21/18 1058      Row Name 05/21/18 0930             Lower Extremity Seated Therapeutic Exercise    Performed, Seated Lower Extremity (Therapeutic Exercise) hip flexion/extension;LAQ (long arc quad), knee extension;ankle dorsiflexion/plantarflexion  -MB      Exercise Type, Seated Lower Extremity (Therapeutic Exercise) AROM (active range of motion)  -MB      Sets/Reps Detail, Seated Lower Extremity (Therapeutic Exercise) 1 set / 15 reps BLEs  -MB      Comment, Seated Lower Extremity (Therapeutic Exercise) frequent rest breaks d/t SOA  -MB      Recorded by [MB] Rosi Bautista, PT 05/21/18 1058      Row Name 05/21/18 0930             Therapeutic Exercise    Lower Extremity (Therapeutic Exercise)  --  -MB      Recorded by [MB] Rosi Bautista, PT 05/21/18 1058      Row Name 05/21/18 0930             Dynamic Standing Balance    Level of [place], Reaches Outside Midline (Standing, Dynamic Balance) contact guard assist   mini-squats x 5 reps, inc. SOA  -MB      Recorded by [MB] Rosi Bautista, PT 05/21/18 1058      Row Name 05/21/18 0930             Positioning and Restraints    Pre-Treatment Position in bed  -MB      Post Treatment Position chair  -MB      In Chair notified nsg;reclined;call light within reach;encouraged to call for assist;legs elevated  -MB      Recorded by [MB] Rosi Bautista, PT 05/21/18 1058      Row Name 05/21/18 0930             Pain Scale: Numbers Pre/Post-Treatment    Pain Scale: Numbers, Pretreatment 3/10  -MB      Pain Scale: Numbers, Post-Treatment 3/10  -MB      Pain Location - Side Bilateral  -MB      Pain Location knee  -MB      Pain Intervention(s) Repositioned;Ambulation/increased activity  -MB      Recorded by [MB] Rosi Bautista, PT 05/21/18 1058      Row Name 05/21/18 0967             Plan of Care Review    Plan of Care Reviewed With patient  -MB      Recorded by [MB] Rosi Bautista, PT 05/21/18 1058      Row Name 05/21/18 0942             Outcome Summary/Treatment Plan (PT)    Daily Summary of Progress (PT) progress toward functional goals is good  -MB      Plan for Continued Treatment (PT) Cont IPPT per POC.  -MB      Anticipated Discharge Disposition (PT) inpatient rehab facility   pulmonary rehab  -MB      Recorded by [MB] Rosi Bautista, PT 05/21/18 1058        User Key  (r) = Recorded By, (t) = Taken By, (c) = Cosigned By    Initials Name Effective Dates Discipline    ANDREWS Bautista, PT 03/14/16 -  PT                     Physical Therapy Education     Title: PT OT SLP Therapies (Active)     Topic: Physical Therapy (Active)     Point: Mobility training (Active)    Learning Progress Summary     Learner Status Readiness Method Response  Comment Documented by    Patient Active Acceptance E NR  KR 05/18/18 1052          Point: Body mechanics (Active)    Learning Progress Summary     Learner Status Readiness Method Response Comment Documented by    Patient Active Acceptance E NR  KR 05/18/18 1052          Point: Precautions (Active)    Learning Progress Summary     Learner Status Readiness Method Response Comment Documented by    Patient Active Acceptance E NR  KR 05/18/18 1052                      User Key     Initials Effective Dates Name Provider Type Discipline     04/03/18 -  Marija Stephenson, PT Physical Therapist PT                    PT Recommendation and Plan  Anticipated Discharge Disposition (PT): inpatient rehab facility (pulmonary rehab)  Therapy Frequency (PT Clinical Impression): daily  Outcome Summary/Treatment Plan (PT)  Daily Summary of Progress (PT): progress toward functional goals is good  Plan for Continued Treatment (PT): Cont IPPT per POC.  Anticipated Discharge Disposition (PT): inpatient rehab facility (pulmonary rehab)  Plan of Care Reviewed With: patient  Progress: improving  Outcome Summary: Pt. demo improved safety and independence w/ mobility; however, pt. continues to readily fatiuge.  Pt. performed transfers and gait w/ SBA and progressed forward ambulation to 100ft x 2, on 2L.  Pt. desaturated to 81% post ambulation and required 1 standing rest break.  Pt. participated in standing and seated BLE TherEx, requiring frequent rest breaks d/t SOA.  Recommend cont IPPT per POC.          Outcome Measures     Row Name 05/21/18 0930 05/20/18 1526          How much help from another person do you currently need...    Turning from your back to your side while in flat bed without using bedrails? 4  -MB  --     Moving from lying on back to sitting on the side of a flat bed without bedrails? 4  -MB  --     Moving to and from a bed to a chair (including a wheelchair)? 3  -MB  --     Standing up from a chair using your arms (e.g.,  wheelchair, bedside chair)? 3  -MB  --     Climbing 3-5 steps with a railing? 2  -MB  --     To walk in hospital room? 3  -MB  --     AM-PAC 6 Clicks Score 19  -MB  --        How much help from another is currently needed...    Putting on and taking off regular lower body clothing?  -- 4   as long as pt. has multiple rest periods available.  -DANA     Bathing (including washing, rinsing, and drying)  -- 4  -DANA     Toileting (which includes using toilet bed pan or urinal)  -- 4  -DANA     Putting on and taking off regular upper body clothing  -- 4  -DANA     Taking care of personal grooming (such as brushing teeth)  -- 4  -DANA     Eating meals  -- 4  -DANA     Score  -- 24  -DANA        Functional Assessment    Outcome Measure Options AM-PAC 6 Clicks Basic Mobility (PT)  -MB AM-PAC 6 Clicks Daily Activity (OT)  -DANA       User Key  (r) = Recorded By, (t) = Taken By, (c) = Cosigned By    Initials Name Provider Type    DANA Winkler, OT Occupational Therapist    ANDREWS Bautista, PT Physical Therapist           Time Calculation:         PT Charges     Row Name 05/21/18 1102             Time Calculation    Start Time 0930  -MB      PT Received On 05/21/18  -MB      PT Goal Re-Cert Due Date 05/28/18  -MB         Time Calculation- PT    Total Timed Code Minutes- PT 32 minute(s)  -MB        User Key  (r) = Recorded By, (t) = Taken By, (c) = Cosigned By    Initials Name Provider Type    ANDREWS Bautista, PT Physical Therapist          Therapy Charges for Today     Code Description Service Date Service Provider Modifiers Qty    87960469661 HC GAIT TRAINING EA 15 MIN 5/21/2018 Rosi Bautista, PT GP 1    [id number] HC PT THER PROC EA 15 MIN 5/21/2018 Rosi Bautista, PT GP 1          PT G-Codes  Outcome Measure Options: AM-PAC 6 Clicks Basic Mobility (PT)    Rosi Bautista, PT  5/21/2018

## 2018-05-22 VITALS
WEIGHT: 153 LBS | HEIGHT: 66 IN | OXYGEN SATURATION: 99 % | TEMPERATURE: 97.3 F | SYSTOLIC BLOOD PRESSURE: 131 MMHG | HEART RATE: 94 BPM | DIASTOLIC BLOOD PRESSURE: 90 MMHG | BODY MASS INDEX: 24.59 KG/M2 | RESPIRATION RATE: 20 BRPM

## 2018-05-22 LAB
ANION GAP SERPL CALCULATED.3IONS-SCNC: 6 MMOL/L (ref 3–11)
BUN BLD-MCNC: 20 MG/DL (ref 9–23)
BUN/CREAT SERPL: 28.6 (ref 7–25)
CALCIUM SPEC-SCNC: 9.1 MG/DL (ref 8.7–10.4)
CHLORIDE SERPL-SCNC: 97 MMOL/L (ref 99–109)
CO2 SERPL-SCNC: 36 MMOL/L (ref 20–31)
CREAT BLD-MCNC: 0.7 MG/DL (ref 0.6–1.3)
DEPRECATED RDW RBC AUTO: 53.6 FL (ref 37–54)
ERYTHROCYTE [DISTWIDTH] IN BLOOD BY AUTOMATED COUNT: 15.6 % (ref 11.3–14.5)
GFR SERPL CREATININE-BSD FRML MDRD: 87 ML/MIN/1.73
GLUCOSE BLD-MCNC: 68 MG/DL (ref 70–100)
HCT VFR BLD AUTO: 40.7 % (ref 34.5–44)
HGB BLD-MCNC: 12.4 G/DL (ref 11.5–15.5)
MCH RBC QN AUTO: 28.6 PG (ref 27–31)
MCHC RBC AUTO-ENTMCNC: 30.5 G/DL (ref 32–36)
MCV RBC AUTO: 94 FL (ref 80–99)
PLATELET # BLD AUTO: 236 10*3/MM3 (ref 150–450)
PMV BLD AUTO: 11.1 FL (ref 6–12)
POTASSIUM BLD-SCNC: 4 MMOL/L (ref 3.5–5.5)
RBC # BLD AUTO: 4.33 10*6/MM3 (ref 3.89–5.14)
SODIUM BLD-SCNC: 139 MMOL/L (ref 132–146)
WBC NRBC COR # BLD: 12.21 10*3/MM3 (ref 3.5–10.8)

## 2018-05-22 PROCEDURE — 94760 N-INVAS EAR/PLS OXIMETRY 1: CPT

## 2018-05-22 PROCEDURE — 63710000001 PREDNISONE PER 1 MG: Performed by: NURSE PRACTITIONER

## 2018-05-22 PROCEDURE — 85027 COMPLETE CBC AUTOMATED: CPT | Performed by: NURSE PRACTITIONER

## 2018-05-22 PROCEDURE — 94640 AIRWAY INHALATION TREATMENT: CPT

## 2018-05-22 PROCEDURE — 63710000001 PREDNISONE PER 5 MG: Performed by: NURSE PRACTITIONER

## 2018-05-22 PROCEDURE — 94799 UNLISTED PULMONARY SVC/PX: CPT

## 2018-05-22 PROCEDURE — 99239 HOSP IP/OBS DSCHRG MGMT >30: CPT | Performed by: NURSE PRACTITIONER

## 2018-05-22 PROCEDURE — 80048 BASIC METABOLIC PNL TOTAL CA: CPT | Performed by: NURSE PRACTITIONER

## 2018-05-22 RX ORDER — PANTOPRAZOLE SODIUM 40 MG/1
40 TABLET, DELAYED RELEASE ORAL DAILY
Start: 2018-05-22

## 2018-05-22 RX ORDER — PREDNISONE 10 MG/1
TABLET ORAL
Start: 2018-05-22

## 2018-05-22 RX ORDER — IBUPROFEN 600 MG/1
600 TABLET ORAL EVERY 8 HOURS PRN
Start: 2018-05-22

## 2018-05-22 RX ORDER — BUDESONIDE AND FORMOTEROL FUMARATE DIHYDRATE 160; 4.5 UG/1; UG/1
2 AEROSOL RESPIRATORY (INHALATION)
Qty: 1 INHALER | Refills: 0
Start: 2018-05-22

## 2018-05-22 RX ORDER — ROFLUMILAST 500 UG/1
500 TABLET ORAL DAILY
Qty: 30 TABLET
Start: 2018-05-23

## 2018-05-22 RX ORDER — IPRATROPIUM BROMIDE AND ALBUTEROL SULFATE 2.5; .5 MG/3ML; MG/3ML
3 SOLUTION RESPIRATORY (INHALATION) EVERY 4 HOURS PRN
Qty: 360 ML
Start: 2018-05-22

## 2018-05-22 RX ORDER — AZITHROMYCIN 250 MG/1
TABLET, FILM COATED ORAL
Start: 2018-05-23

## 2018-05-22 RX ORDER — ACETAMINOPHEN 325 MG/1
650 TABLET ORAL EVERY 6 HOURS PRN
Start: 2018-05-22

## 2018-05-22 RX ADMIN — IPRATROPIUM BROMIDE AND ALBUTEROL SULFATE 3 ML: 2.5; .5 SOLUTION RESPIRATORY (INHALATION) at 07:40

## 2018-05-22 RX ADMIN — LEVOTHYROXINE SODIUM 112 MCG: 112 TABLET ORAL at 06:03

## 2018-05-22 RX ADMIN — BUPROPION HYDROCHLORIDE 150 MG: 150 TABLET, EXTENDED RELEASE ORAL at 08:57

## 2018-05-22 RX ADMIN — ROFLUMILAST 500 MCG: 500 TABLET ORAL at 08:56

## 2018-05-22 RX ADMIN — AZITHROMYCIN 250 MG: 250 TABLET, FILM COATED ORAL at 08:57

## 2018-05-22 RX ADMIN — IBUPROFEN 600 MG: 600 TABLET ORAL at 12:30

## 2018-05-22 RX ADMIN — PREDNISONE 30 MG: 20 TABLET ORAL at 08:57

## 2018-05-22 RX ADMIN — IPRATROPIUM BROMIDE AND ALBUTEROL SULFATE 3 ML: 2.5; .5 SOLUTION RESPIRATORY (INHALATION) at 13:54

## 2018-05-22 RX ADMIN — GABAPENTIN 300 MG: 300 CAPSULE ORAL at 08:57

## 2018-05-22 RX ADMIN — PANTOPRAZOLE SODIUM 40 MG: 40 TABLET, DELAYED RELEASE ORAL at 06:03

## 2018-05-22 RX ADMIN — IPRATROPIUM BROMIDE AND ALBUTEROL SULFATE 3 ML: 2.5; .5 SOLUTION RESPIRATORY (INHALATION) at 00:00

## 2018-05-22 NOTE — DISCHARGE SUMMARY
[facility] Medicine Services  DISCHARGE SUMMARY    Patient Name: Lalita Valentino  : 1965  MRN: [id number]    Date of Admission: 5/15/2018  Date of Discharge:  2018  Primary Care Physician: Skye Francisco MD    Consults     No orders found from 2018 to 2018.        Hospital Course     Presenting Problem:   Respiratory failure [J96.90]  Pneumonia [J18.9]  Acute on chronic respiratory failure with hypoxia and hypercapnia [J96.21, J96.22]    Active Hospital Problems (** Indicates Principal Problem)    Diagnosis Date Noted   • Acute on chronic respiratory failure with hypoxia and hypercapnia [J96.21, J96.22] 05/15/2018   • Chronic obstructive pulmonary disease with acute exacerbation [J44.1] 05/15/2018   • Anxiety and depression [F41.8] 05/15/2018   • Hypothyroid [E03.9] 05/15/2018   • Pneumonia due to infectious organism [J18.9] 05/15/2018      Resolved Hospital Problems    Diagnosis Date Noted Date Resolved   No resolved problems to display.          Hospital Course:  Lalita Valentino is a 52 y.o. female with history of COPD, anxiety, depression, hypothyroidism who was found tripoding at home by EMS.  She does have recurrent admissions for respiratory failure and has been intubated 4 times in the past.  She was emergently intubated at [facility] emergency room.  She was admitted to [facility] with acute exacerbation of COPD as well as plus or minus pneumonia.  Due to mechanical ventilation and she was admitted to the ICU.  He was quickly extubated.  It was felt her symptoms were most likely due to COPD exacerbation vs pneumonia. Antibiotics were stopped.  Pulmonary did put her on chronic daily azithromycin and Daliresp for recurrent exacerbations.  Her legionella and strep antigens were negative.  Blood culture and sputum culture have been negative.  Started her on Symbicort.  DuoNeb scheduled should continue.  At discharge from [facility]  she will need long-acting anti-cholinergic.  Recommend she see pulmonary in 4 weeks.    Due to abdominal distention she did have a CT of her abdomen pelvis which was negative.  That has resolved.  She did have mildly elevated troponin that was felt to be due to demand ischemia from COPD.  Repeats were negative.  She did have an echocardiogram that was showed some wall motion abnormalities.  She should follow up with cardiology in the future.  She may need a stress test.  Early on she had some dysphasia.  That has resolved.  PT OT has seen patient and recommended inpatient rehabilitation.  This will take place at [facility].           Day of Discharge     HPI:   Feels good, agreeable to rehab, thinks the [facility] is really helping    Review of Systems  Gen- No fevers, chills  CV- No chest pain, palpitations  Resp- No cough, dyspnea  GI- No N/V/D, abd pain      Otherwise ROS is negative except as mentioned in the HPI.    Vital Signs:   Temp:  [97.3 °F (36.3 °C)-98.9 °F (37.2 °C)] 97.3 °F (36.3 °C)  Heart Rate:  [] 94  Resp:  [15-20] 20  BP: ()/(74-90) 131/90     Physical Exam:  Gen-no acute distress, resting in bed  CV-RRR, S1 S2 normal, no m/r/g  Resp-decreased all over, normal WOB  Abd-soft, NT, ND, +BS  Ext-no edema, PPP  Neuro-A&Ox3, no focal deficits  Psych-appropriate mood      Pertinent  and/or Most Recent Results       Results from last 7 days  Lab Units 05/18/18  0630 05/17/18  0413 05/16/18  0523 05/15/18  1921   WBC 10*3/mm3 12.69* 10.63 12.13* 16.26*   HEMOGLOBIN g/dL 11.9 10.8* 10.3* 12.0   HEMATOCRIT % 38.7 35.6 33.1* 39.9   PLATELETS 10*3/mm3 236 217 177 312   SODIUM mmol/L 142 141 137 139   POTASSIUM mmol/L 4.0 4.5 4.3 4.7   CHLORIDE mmol/L 102 106 103 103   CO2 mmol/L 29.0 29.0 29.0 29.0   BUN mg/dL 24* 23 23 24*   CREATININE mg/dL 0.80 0.70 0.80 0.90   GLUCOSE mg/dL 96 122* 122* 131*   CALCIUM mg/dL 9.4 9.1 8.6* 8.6*       Results from last 7 days  Lab Units 05/18/18  0630  05/17/18  0413 05/16/18  0523 05/15/18  1921   BILIRUBIN mg/dL 0.6 0.4 0.5 0.5   ALK PHOS U/L 65 63 60 81   ALT (SGPT) U/L 25 20 21 31   AST (SGOT) U/L 21 17 18 27   PROTIME Seconds  --   --   --  10.2   INR   --   --   --  0.97         Results from last 7 days  Lab Units 05/17/18  0413 05/16/18  1243 05/15/18  1921   TSH mIU/mL  --   --  1.154   BNP pg/mL  --   --  163.0*   TROPONIN I ng/mL 0.010 0.018 0.045*     Brief Urine Lab Results  (Last result in the past 365 days)      Color   Clarity   Blood   Leuk Est   Nitrite   Protein   CREAT   Urine HCG        05/15/18 2138 Yellow Clear Small (1+)(A) Small (1+)(A) Negative 30 mg/dL (1+)(A)               Microbiology Results Abnormal     Procedure Component Value - Date/Time    Blood Culture - Blood, [[id number]]  (Normal) Collected:  05/16/18 0523    Lab Status:  Final result Specimen:  Blood from Hand, Right Updated:  05/21/18 0700     Blood Culture No growth at 5 days    Blood Culture - Blood, [[id number]]  (Normal) Collected:  05/15/18 2028    Lab Status:  Final result Specimen:  Blood from Hand, Right Updated:  05/20/18 2115     Blood Culture No growth at 5 days    Legionella Antigen, Urine - Urine, Urine, Clean Catch [[id number]] Collected:  05/15/18 2138    Lab Status:  Final result Specimen:  Urine from Urine, Clean Catch Updated:  05/18/18 1518     L. pneumophila Serogp 1 Ur Ag Negative     Comment: Presumptive negative for L. pneumophila serogroup 1 antigen in urine,  suggesting no recent or current infection. Legionnaires' disease  cannot be ruled out since other serogroups and species may also cause  disease.       Narrative:       Performed at:  [address] [place] [address]  [id number]  : Mika Jackson MD, Phone:  [phone]    S. Pneumo Ag Urine or CSF - Urine, Urine, Clean Catch [[id number]] Collected:  05/15/18 2138    Lab Status:  Final result Specimen:  Urine from Urine, Clean Catch Updated:  05/18/18 2229      Specimen Source Urine     STREP PNEUMONIAE ANTIGEN Negative     Body Fluid Culture, Sterile Not Indicated     Organism ID Not indicated.     Please note Comment     Comment: College [employer]ologists standards require a culture to be  performed on CSF specimens submitted for bacterial antigen testing.  (CAP [id number]) Urine specimens will not be cultured.       Narrative:       Performed at:  [address] [place] [address]  [id number]  : Mika Jackson MD, Phone:  [phone]    Respiratory Culture - Sputum, ET Suction [[id number]] Collected:  05/16/18 0110    Lab Status:  Final result Specimen:  Sputum from ET Suction Updated:  05/18/18 0841     Respiratory Culture No growth at 2 days     Gram Stain Result Few (2+) WBCs per low power field      Rare (1+) Epithelial cells per low power field      Rare (1+) Gram positive cocci in pairs and chains      Rare (1+) Normal respiratory reji          Imaging Results (all)     Procedure Component Value Units Date/Time    CT Abdomen Pelvis With & Without Contrast [[id number]] Collected:  05/16/18 1637     Updated:  05/19/18 6380    Narrative:       EXAMINATION: CT ABDOMEN AND PELVIS W WO CONTRAST-05/16/2018:      INDICATION: Bowel obstruction, low grade.         TECHNIQUE: 5 mm postoral contrast images, post-IV contrast portal venous  phase and 5 mm delayed venous phase images through the abdomen and  pelvis.     The radiation dose reduction device was turned on for each scan per the  ALARA (As Low as Reasonably Achievable) protocol.     COMPARISON: NONE.     FINDINGS: The patient history indicates generalized abdominal pain,  abdominal distention, possible bowel obstruction.     There is patchy disease in the lingula and left lower lobe, suspicious  for mild changes of pneumonia. Only minimal interstitial changes are  present in the right base. There is no pleural effusion.     There is a small nonenhancing left lobe liver cyst.  "The liver otherwise  appears unremarkable. The gallbladder is distended but otherwise appears  within normal limits. The spleen is normal in size and contains  granulomatous calcifications. Pancreas, adrenal glands, and kidneys  appear within normal limits. No upper abdominal free air, ascites,  adenopathy, or acute inflammatory change is seen.     Large and small bowel loops are mostly well contrast opacified. No  abnormally dilated small bowel loops are seen. Contrast passes readily  into the terminal ileum and cecum, and into the right colon. There is a  moderate amount of formed stool throughout the colon, but no obvious  impaction. The colon itself is mildly distended with gas and stool, but  no gross abnormalities are seen. In particular, no inflammatory change,  mucosal thickening, etc., is seen of the colon.     Elsewhere in the pelvis, the bladder is decompressed by a Bhardwaj balloon  catheter. Delayed images show good contrast opacification of the renal  collecting systems, ureters, and bladder. The uterus appears to be  present but atrophic. Ovaries are not definitely identified presumably  atrophic or absent. There is a small normal sized contrast-filled  retrocecal appendix. No intrapelvic mass or inflammatory change is seen.       Impression:       1. \"Gassy\" appearance of the abdomen, mostly as a result of mild  distention of the colon with air and considerable formed stool. While  this may represent moderate fecal stasis, there is no obvious impaction  or evidence of inflammation. No significant resulting small bowel  distention.  2. No evidence of acute inflammatory process or other acute  intra-abdominal or intrapelvic disease elsewhere.  3. Patchy lingular and left lower lobe disease, of some concern for mild  changes of pneumonia or aspiration. Minimal right lower lung disease.     D:  05/16/2018  E:  05/16/2018           This report was finalized on 5/19/2018 11:11 PM by DR. Remberto Connolly MD.       " XR Chest 1 View [[id number]] Collected:  05/16/18 1434     Updated:  05/16/18 2306    Narrative:       EXAMINATION: XR CHEST 1 VW-05/16/2018:      INDICATION: Respiratory failure.      COMPARISON: 06/05/2012.     FINDINGS: ET tube and NG tube appear to be in good position. The heart  is upper normal size. The vasculature appears cephalized. There is  mildly increased interstitial disease overall, in a pattern resembling  peribronchial thickening as from bronchitis. Focal disease in the left  upper lobe, however, appears improved. No pneumothorax or effusion is  seen.       Impression:       1.  Mild, improving left upper lung airspace disease.  2.  Interval development of mild diffuse interstitial disease pattern  elsewhere possibly bronchitis. Please correlate with symptoms.     D:  05/16/2018  E:  05/16/2018     This report was finalized on 5/16/2018 11:04 PM by DR. Remberto Connolly MD.       XR Chest 1 View [[id number]] Collected:  05/15/18 1902     Updated:  05/15/18 2012    Narrative:       EXAM:    XR Chest, 1 View    CLINICAL HISTORY:    52 years old, female; Device placement; Ett placement (vent status);   Additional info: Confirm et tube placement    TECHNIQUE:    Frontal view of the chest.    COMPARISON:    No relevant prior studies available.    FINDINGS:    Emphysema, focal air space opacity in the LEFT upper lobe measuring   approximately 6.7 cm. No pleural effusion or pneumothorax.  Cardiomegaly with   normal lung vascularity.       Distance between tip of ENDOTRACHEAL tube and danny is approximately 5.8 cm.   Tip of NASOGASTRIC/ENTERIC tube just at or proximal to the gastroesophageal   junction.        Impression:         Large area of consolidation/PNEUMONIA in the LEFT upper lobe. Recommend   followup to complete resolution.       Tip of NASOGASTRIC/ENTERIC tube just at or proximal to the gastroesophageal   junction.  Recommend advancing the tube by 7-8 cm.    THIS DOCUMENT HAS BEEN ELECTRONICALLY SIGNED  BY FEI ESPINOSA MD                    Results for orders placed during the hospital encounter of 05/15/18   Adult Transthoracic Echo Complete W/ Cont if Necessary Per Protocol    Narrative · Mild mitral valve regurgitation is present.  · Mild tricuspid valve regurgitation is present.  · Calculated right ventricular systolic pressure from tricuspid   regurgitation is 31 mmHg.  · The following left ventricular wall segments are hypokinetic: basal   inferolateral, mid inferior and basal inferior.  · Left ventricular systolic function is normal. Estimated EF = 56%.  · Left ventricular diastolic dysfunction (grade I) consistent with   impaired relaxation.  · There is no evidence of pericardial effusion.  · No evidence of pulmonary hypertension is present.  · The aortic valve exhibits sclerosis.  · Normal right ventricular cavity size, wall thickness, systolic function   and septal motion noted.            Discharge Details      Lalita Valentino   Home Medication Instructions ARTIE:[id number]    Printed on:05/22/18 1036   Medication Information                      acetaminophen (TYLENOL) 325 MG tablet  Take 2 tablets by mouth Every 6 (Six) Hours As Needed for Mild Pain .             azithromycin (ZITHROMAX) 250 MG tablet  1 po once daily             budesonide-formoterol (SYMBICORT) 160-4.5 MCG/ACT inhaler  Inhale 2 puffs 2 (Two) Times a Day.             buPROPion SR (WELLBUTRIN SR) 150 MG 12 hr tablet  Take 150 mg by mouth 2 (Two) Times a Day.             gabapentin (NEURONTIN) 300 MG capsule  Take 300 mg by mouth 2 (Two) Times a Day.             ibuprofen (ADVIL,MOTRIN) 600 MG tablet  Take 1 tablet by mouth Every 8 (Eight) Hours As Needed for Mild Pain .             ipratropium-albuterol (DUO-NEB) 0.5-2.5 mg/3 ml nebulizer  Take 3 mL by nebulization Every 4 (Four) Hours As Needed for Wheezing or Shortness of Air.             levothyroxine (SYNTHROID, LEVOTHROID) 112 MCG tablet  Take 112 mcg by mouth Daily.              mirtazapine (REMERON) 15 MG tablet  Take 15 mg by mouth Every Night.             pantoprazole (PROTONIX) 40 MG EC tablet  Take 1 tablet by mouth Daily.             predniSONE (DELTASONE) 10 MG tablet  First dose 5/23, take 30mg once daily x 2 days, 20mg once daily x 3 days, 10mg once daily x 3 days             roflumilast (DALIRESP) 500 MCG tablet tablet  Take 1 tablet by mouth Daily.                   Discharge Disposition:  [facility]    Discharge Diet: as tolerated      Discharge Activity: as tolerated      No future appointments.    Additional Instructions for the Follow-ups that You Need to Schedule     Discharge Follow-up with PCP    As directed      Follow Up Details:  PCP in a few days from DC from [facility]         Discharge Follow-up with Specialty: [facility] in 1 month    As directed      Specialty:  [facility] in 1 month               Time Spent on Discharge:  40 minutes    Electronically signed by YESY Mcgrath, 05/22/18, 10:34 AM.

## 2018-05-22 NOTE — PROGRESS NOTES
Case Management Discharge Note    Final Note: Spoke to Sushila with [facility] who has bed for patient today on the Pulmonary unit.  Spoke with patient at bedside who is in agreement with plan.  CM advised patient that SW will assist her with clothing and a cab voucher has been provided for transport to [facility].  No other discharge needs noted at this time.  Patient plan is to discharge to [facility] pulmonary unit today via cab for transport.  Nursing to call report to [phone].     Destination - Selection Complete     Service Request Status Selected Specialties Address Phone Number Fax Number    [facility] Selected Rehabilitation [address] [facility] [address] [phone]      Durable Medical Equipment     No service coordination in this encounter.      Dialysis/Infusion     No service coordination in this encounter.      Home Medical Care     No service coordination in this encounter.      Social Care     No service coordination in this encounter.             Final Discharge Disposition Code: 62 - inpatient rehab facility

## 2018-05-22 NOTE — PROGRESS NOTES
Continued Stay Note  [facility]     Patient Name: Lalita Valentino  MRN: [id number]  Today's Date: 5/22/2018    Admit Date: 5/15/2018          Discharge Plan     Row Name 05/22/18 1145       Plan    Plan Comments SW provided clothes for pt out of  clothes closet.    Row Name 05/22/18 1029       Plan    Plan [facility]    Patient/Family in Agreement with Plan yes    Plan Comments Spoke to [facility] with [facility] who has bed for patient today on the Pulmonary unit.  Spoke with patient at bedside who is in agreement with plan.  CM advised patient that SW will assist her with clothing and a cab voucher has been provided for transport to [facility].  No other discharge needs noted at this time.  Patient plan is to discharge to [facility] pulmonary unit today via cab for transport.  Nursing to call report to [phone].     Final Discharge Disposition Code 62 - inpatient rehab facility    Final Note Spoke to Sushila with [facility] who has bed for patient today on the Pulmonary unit.  Spoke with patient at bedside who is in agreement with plan.  CM advised patient that SW will assist her with clothing and a cab voucher has been provided for transport to [facility].  No other discharge needs noted at this time.  Patient plan is to discharge to [facility] pulmonary unit today via cab for transport.  Nursing to call report to [phone].               Discharge Codes    No documentation.       Expected Discharge Date and Time     Expected Discharge Date Expected Discharge Time    May 22, 2018             ANITA Biggs

## 2018-05-22 NOTE — PLAN OF CARE
Problem: Patient Care Overview  Goal: Plan of Care Review  Outcome: Ongoing (interventions implemented as appropriate)   05/22/18 0352   Coping/Psychosocial   Plan of Care Reviewed With patient   Plan of Care Review   Progress no change   OTHER   Outcome Summary rested during the night. no acute events Vital signs stable.      Goal: Individualization and Mutuality  Outcome: Ongoing (interventions implemented as appropriate)    Goal: Discharge Needs Assessment  Outcome: Ongoing (interventions implemented as appropriate)    Goal: Interprofessional Rounds/Family Conf  Outcome: Ongoing (interventions implemented as appropriate)      Problem: Skin Injury Risk (Adult)  Goal: Skin Health and Integrity  Outcome: Ongoing (interventions implemented as appropriate)      Problem: Ventilation, Mechanical Invasive (Adult)  Goal: Signs and Symptoms of Listed Potential Problems Will be Absent, Minimized or Managed (Ventilation, Mechanical Invasive)  Outcome: Ongoing (interventions implemented as appropriate)      Problem: Breathing Pattern Ineffective (Adult)  Goal: Identify Related Risk Factors and Signs and Symptoms  Outcome: Ongoing (interventions implemented as appropriate)    Goal: Effective Oxygenation/Ventilation  Outcome: Ongoing (interventions implemented as appropriate)    Goal: Anxiety/Fear Reduction  Outcome: Ongoing (interventions implemented as appropriate)